# Patient Record
Sex: FEMALE | Race: OTHER | NOT HISPANIC OR LATINO | URBAN - METROPOLITAN AREA
[De-identification: names, ages, dates, MRNs, and addresses within clinical notes are randomized per-mention and may not be internally consistent; named-entity substitution may affect disease eponyms.]

---

## 2023-08-15 LAB — CHORIOGONADOTROPIN (MIU/ML) IN SER/PLAS: 203 MIU/ML

## 2023-09-15 LAB
ABO GROUP (TYPE) IN BLOOD: NORMAL
ANTIBODY SCREEN: NORMAL
ERYTHROCYTE DISTRIBUTION WIDTH (RATIO) BY AUTOMATED COUNT: 12.5 % (ref 11.5–14.5)
ERYTHROCYTE MEAN CORPUSCULAR HEMOGLOBIN CONCENTRATION (G/DL) BY AUTOMATED: 34 G/DL (ref 32–36)
ERYTHROCYTE MEAN CORPUSCULAR VOLUME (FL) BY AUTOMATED COUNT: 94 FL (ref 80–100)
ERYTHROCYTES (10*6/UL) IN BLOOD BY AUTOMATED COUNT: 4.71 X10E12/L (ref 4–5.2)
ESTIMATED AVERAGE GLUCOSE FOR HBA1C: 100 MG/DL
HEMATOCRIT (%) IN BLOOD BY AUTOMATED COUNT: 44.1 % (ref 36–46)
HEMOGLOBIN (G/DL) IN BLOOD: 15 G/DL (ref 12–16)
HEMOGLOBIN A1C/HEMOGLOBIN TOTAL IN BLOOD: 5.1 %
HEPATITIS B VIRUS SURFACE AG PRESENCE IN SERUM: NONREACTIVE
HEPATITIS C VIRUS AB PRESENCE IN SERUM: NONREACTIVE
HIV 1/ 2 AG/AB SCREEN: NONREACTIVE
LEUKOCYTES (10*3/UL) IN BLOOD BY AUTOMATED COUNT: 12.4 X10E9/L (ref 4.4–11.3)
PLATELETS (10*3/UL) IN BLOOD AUTOMATED COUNT: 450 X10E9/L (ref 150–450)
REFLEX ADDED, ANEMIA PANEL: ABNORMAL
RH FACTOR: NORMAL
RUBELLA VIRUS IGG AB: POSITIVE
SYPHILIS TOTAL AB: NONREACTIVE
VARICELLA ZOSTER IGG: NEGATIVE

## 2023-09-16 LAB — URINE CULTURE: NORMAL

## 2023-09-19 LAB
CHLAMYDIA TRACH., AMPLIFIED: NEGATIVE
N. GONORRHEA, AMPLIFIED: NEGATIVE
TRICHOMONAS VAGINALIS: NEGATIVE

## 2023-09-28 PROBLEM — Z3A.09 9 WEEKS GESTATION OF PREGNANCY (HHS-HCC): Status: ACTIVE | Noted: 2023-09-16

## 2023-09-28 PROBLEM — R12 HEARTBURN DURING PREGNANCY, ANTEPARTUM (HHS-HCC): Status: ACTIVE | Noted: 2023-09-16

## 2023-09-28 PROBLEM — Z34.01: Status: ACTIVE | Noted: 2023-09-15

## 2023-09-28 PROBLEM — O26.899 HEARTBURN DURING PREGNANCY, ANTEPARTUM (HHS-HCC): Status: ACTIVE | Noted: 2023-09-16

## 2023-09-28 RX ORDER — SUCRALFATE 1 G/1
1 TABLET ORAL 3 TIMES DAILY
COMMUNITY
End: 2024-01-04 | Stop reason: WASHOUT

## 2023-10-09 ENCOUNTER — ROUTINE PRENATAL (OUTPATIENT)
Dept: OBSTETRICS AND GYNECOLOGY | Facility: CLINIC | Age: 23
End: 2023-10-09
Payer: COMMERCIAL

## 2023-10-09 VITALS — DIASTOLIC BLOOD PRESSURE: 78 MMHG | WEIGHT: 176 LBS | SYSTOLIC BLOOD PRESSURE: 114 MMHG

## 2023-10-09 DIAGNOSIS — Z13.79 ENCOUNTER FOR GENETIC SCREENING: ICD-10-CM

## 2023-10-09 DIAGNOSIS — O30.041 DICHORIONIC DIAMNIOTIC TWIN PREGNANCY IN FIRST TRIMESTER (HHS-HCC): ICD-10-CM

## 2023-10-09 DIAGNOSIS — E66.9 OBESITY, CLASS II, BMI 35-39.9: ICD-10-CM

## 2023-10-09 DIAGNOSIS — Z3A.12 12 WEEKS GESTATION OF PREGNANCY (HHS-HCC): ICD-10-CM

## 2023-10-09 DIAGNOSIS — O26.899 HEARTBURN DURING PREGNANCY, ANTEPARTUM (HHS-HCC): ICD-10-CM

## 2023-10-09 DIAGNOSIS — R12 HEARTBURN DURING PREGNANCY, ANTEPARTUM (HHS-HCC): ICD-10-CM

## 2023-10-09 DIAGNOSIS — O09.611 YOUNG PRIMIGRAVIDA IN FIRST TRIMESTER (HHS-HCC): Primary | ICD-10-CM

## 2023-10-09 PROBLEM — O30.049 DICHORIONIC DIAMNIOTIC TWIN GESTATION (HHS-HCC): Status: ACTIVE | Noted: 2023-10-09

## 2023-10-09 PROCEDURE — 0501F PRENATAL FLOW SHEET: CPT | Performed by: STUDENT IN AN ORGANIZED HEALTH CARE EDUCATION/TRAINING PROGRAM

## 2023-10-09 RX ORDER — FAMOTIDINE 20 MG/1
20 TABLET, FILM COATED ORAL 2 TIMES DAILY
Qty: 90 TABLET | Refills: 3 | Status: SHIPPED | OUTPATIENT
Start: 2023-10-09 | End: 2024-02-20

## 2023-10-09 RX ORDER — ONDANSETRON 4 MG/1
4 TABLET, ORALLY DISINTEGRATING ORAL EVERY 8 HOURS PRN
Qty: 20 TABLET | Refills: 2 | Status: SHIPPED | OUTPATIENT
Start: 2023-10-09 | End: 2023-10-29

## 2023-10-09 NOTE — ASSESSMENT & PLAN NOTE
Pt has f/up US on 10/19. For growths as well. Pt started on baby aspirin and counseled on risk of Pre-E.

## 2023-10-09 NOTE — PROGRESS NOTES
"Subjective   Patient ID [id number]   Kindra Hunt is a 23 y.o.  at Unknown with a working estimated date of delivery of Not found. who presents for a routine prenatal visit. She denies vaginal bleeding, leakage of fluid, decreased fetal movements, and contractions.    Her pregnancy is complicated by:  Di-Di Twin pregnancy    Objective   Physical Exam  Weight: 79.8 kg (176 lb), Pregravid BMI: Could not be calculated  Expected Total Weight Gain: Could not be calculated   BP: 114/78         Prenatal Labs  Urine dip:  Lab Results   Component Value Date    KETONESU NEGATIVE 2020     Lab Results   Component Value Date    HGB 15.0 09/15/2023    HGB 15.0 09/15/2023    HCT 44.1 09/15/2023    HCT 44.1 09/15/2023    HEPBSAG NONREACTIVE 09/15/2023    HEPBSAG NONREACTIVE 09/15/2023     No results found for: \"PAPPA\", \"AFP\", \"HCG\", \"ESTRIOL\", \"INHBA\"    Imaging  The most recent ultrasound was performed on   with a study GA of  .          Assessment/Plan   Problem List Items Addressed This Visit             ICD-10-CM    12 weeks gestation of pregnancy Z3A.12    Heartburn during pregnancy, antepartum O26.899, R12     Started Pepcid         Dichorionic diamniotic twin gestation O30.049     Pt has f/up US on 10/19. For growths as well. Pt started on baby aspirin and counseled on risk of Pre-E.         Obesity, Class II, BMI 35-39.9 E66.9     Other Visit Diagnoses         Codes    Young primigravida in first trimester    -  Primary O09.611    Relevant Medications    famotidine (Pepcid) 20 mg tablet    ondansetron ODT (Zofran-ODT) 4 mg disintegrating tablet    aspirin 81 mg capsule    Other Relevant Orders    Follow Up In Obstetrics    Encounter for genetic screening     Z13.79    Relevant Orders    [facility] Prenatal Screen              Follow up in 4 weeks for a routine prenatal visit.  "

## 2023-10-10 ENCOUNTER — APPOINTMENT (OUTPATIENT)
Dept: OBSTETRICS AND GYNECOLOGY | Facility: CLINIC | Age: 23
End: 2023-10-10
Payer: COMMERCIAL

## 2023-10-19 ENCOUNTER — ANCILLARY PROCEDURE (OUTPATIENT)
Dept: RADIOLOGY | Facility: CLINIC | Age: 23
End: 2023-10-19
Payer: COMMERCIAL

## 2023-10-19 ENCOUNTER — APPOINTMENT (OUTPATIENT)
Dept: RADIOLOGY | Facility: CLINIC | Age: 23
End: 2023-10-19
Payer: COMMERCIAL

## 2023-10-19 DIAGNOSIS — O30.009 TWIN PREGNANCY (HHS-HCC): ICD-10-CM

## 2023-10-19 DIAGNOSIS — O30.049 TWIN PREGNANCY, DICHORIONIC/DIAMNIOTIC, UNSPECIFIED TRIMESTER (HHS-HCC): ICD-10-CM

## 2023-10-19 PROCEDURE — 76813 OB US NUCHAL MEAS 1 GEST: CPT | Performed by: STUDENT IN AN ORGANIZED HEALTH CARE EDUCATION/TRAINING PROGRAM

## 2023-10-19 PROCEDURE — 76814 OB US NUCHAL MEAS ADD-ON: CPT | Performed by: STUDENT IN AN ORGANIZED HEALTH CARE EDUCATION/TRAINING PROGRAM

## 2023-10-19 PROCEDURE — 76813 OB US NUCHAL MEAS 1 GEST: CPT

## 2023-11-06 PROBLEM — Z3A.16 16 WEEKS GESTATION OF PREGNANCY (HHS-HCC): Status: ACTIVE | Noted: 2023-09-16

## 2023-11-08 ENCOUNTER — ROUTINE PRENATAL (OUTPATIENT)
Dept: OBSTETRICS AND GYNECOLOGY | Facility: CLINIC | Age: 23
End: 2023-11-08
Payer: COMMERCIAL

## 2023-11-08 VITALS — SYSTOLIC BLOOD PRESSURE: 118 MMHG | WEIGHT: 175 LBS | DIASTOLIC BLOOD PRESSURE: 82 MMHG

## 2023-11-08 DIAGNOSIS — O30.042 DICHORIONIC DIAMNIOTIC TWIN PREGNANCY IN SECOND TRIMESTER (HHS-HCC): Primary | ICD-10-CM

## 2023-11-08 DIAGNOSIS — Z3A.16 16 WEEKS GESTATION OF PREGNANCY (HHS-HCC): ICD-10-CM

## 2023-11-08 PROCEDURE — 0501F PRENATAL FLOW SHEET: CPT | Performed by: OBSTETRICS & GYNECOLOGY

## 2023-11-08 RX ORDER — ONDANSETRON 4 MG/1
4 TABLET, ORALLY DISINTEGRATING ORAL EVERY 8 HOURS PRN
COMMUNITY

## 2023-11-08 NOTE — PROGRESS NOTES
Subjective   Patient ID [id number]   Kindra Hunt is a 23 y.o. who presents for a routine prenatal visit. She denies vaginal bleeding, leakage of fluid, decreased fetal movements, or contractions.      Objective   Physical Exam  Weight: 79.4 kg (175 lb)  Expected Total Weight Gain: Could not be calculated   Pregravid BMI: Could not be calculated  BP: 118/82  Fetal Heart Rate: 152      Prenatal Labs  Urine dip:  Lab Results   Component Value Date    KETONESU NEGATIVE 12/19/2020       Lab Results   Component Value Date    HGB 15.0 09/15/2023    HGB 15.0 09/15/2023    HCT 44.1 09/15/2023    HCT 44.1 09/15/2023    ABO A 09/15/2023    ABO A 09/15/2023    HEPBSAG NONREACTIVE 09/15/2023    HEPBSAG NONREACTIVE 09/15/2023         Problem List Items Addressed This Visit       16 weeks gestation of pregnancy    Dichorionic diamniotic twin gestation - Primary    Overview     Carrier screen returned negative. NIPS returned risk reducing.  Early anatomy survey and NT returned in normal range X2.  Plan serial growth imaging and delivery at 38.0-38.6.             Continue prenatal vitamin.  Labs reviewed.  Anatomy ultrasound is scheduled. Plan of care was reviewed.  Follow up as scheduled for a routine prenatal visit.

## 2023-12-06 PROBLEM — Z3A.20 20 WEEKS GESTATION OF PREGNANCY (HHS-HCC): Status: ACTIVE | Noted: 2023-09-16

## 2023-12-07 ENCOUNTER — ROUTINE PRENATAL (OUTPATIENT)
Dept: OBSTETRICS AND GYNECOLOGY | Facility: CLINIC | Age: 23
End: 2023-12-07
Payer: COMMERCIAL

## 2023-12-07 VITALS — SYSTOLIC BLOOD PRESSURE: 104 MMHG | WEIGHT: 177 LBS | DIASTOLIC BLOOD PRESSURE: 66 MMHG

## 2023-12-07 DIAGNOSIS — Z3A.20 20 WEEKS GESTATION OF PREGNANCY (HHS-HCC): Primary | ICD-10-CM

## 2023-12-07 DIAGNOSIS — O30.042 DICHORIONIC DIAMNIOTIC TWIN PREGNANCY IN SECOND TRIMESTER (HHS-HCC): ICD-10-CM

## 2023-12-07 PROCEDURE — 0501F PRENATAL FLOW SHEET: CPT | Performed by: OBSTETRICS & GYNECOLOGY

## 2023-12-07 NOTE — PROGRESS NOTES
Subjective   Patient ID [id number]   Kindra Hunt is a 23 y.o. who presents for a routine prenatal visit. She has twins and is 20.1 weeks gestation. She denies vaginal bleeding, leakage of fluid, decreased fetal movements, or contractions.      Objective   Physical Exam  Weight: 80.3 kg (177 lb)  Expected Total Weight Gain: Could not be calculated   Pregravid BMI: Could not be calculated  BP: 104/66  Fetal Heart Rate: 145 Fundal Height (cm): 22 cm   for second twin.  Prenatal Labs  Urine dip:  Lab Results   Component Value Date    KETONESU NEGATIVE 12/19/2020       Lab Results   Component Value Date    HGB 15.0 09/15/2023    HGB 15.0 09/15/2023    HCT 44.1 09/15/2023    HCT 44.1 09/15/2023    ABO A 09/15/2023    ABO A 09/15/2023    HEPBSAG NONREACTIVE 09/15/2023    HEPBSAG NONREACTIVE 09/15/2023         Problem List Items Addressed This Visit       20 weeks gestation of pregnancy - Primary    Dichorionic diamniotic twin gestation    Overview     Carrier screen returned negative. NIPS returned risk reducing.  Early anatomy survey and NT returned in normal range X2.  Plan serial growth imaging and delivery at 38.0-38.6.             Continue prenatal vitamin.  Labs reviewed.  Discussed usn is in 1 week. Glucola next visit.   Follow up as scheduled for a routine prenatal visit.

## 2023-12-13 ENCOUNTER — INITIAL PRENATAL (OUTPATIENT)
Dept: MATERNAL FETAL MEDICINE | Facility: CLINIC | Age: 23
End: 2023-12-13
Payer: COMMERCIAL

## 2023-12-13 ENCOUNTER — ANCILLARY PROCEDURE (OUTPATIENT)
Dept: RADIOLOGY | Facility: CLINIC | Age: 23
End: 2023-12-13
Payer: COMMERCIAL

## 2023-12-13 DIAGNOSIS — O30.049 TWIN PREGNANCY, DICHORIONIC/DIAMNIOTIC, UNSPECIFIED TRIMESTER (HHS-HCC): ICD-10-CM

## 2023-12-13 DIAGNOSIS — Z34.90 PREGNANT (HHS-HCC): ICD-10-CM

## 2023-12-13 DIAGNOSIS — O35.9XX2: Primary | ICD-10-CM

## 2023-12-13 PROCEDURE — 76811 OB US DETAILED SNGL FETUS: CPT

## 2023-12-13 PROCEDURE — 76811 OB US DETAILED SNGL FETUS: CPT | Performed by: OBSTETRICS & GYNECOLOGY

## 2023-12-13 PROCEDURE — 76812 OB US DETAILED ADDL FETUS: CPT | Performed by: OBSTETRICS & GYNECOLOGY

## 2023-12-13 PROCEDURE — 99213 OFFICE O/P EST LOW 20 MIN: CPT | Performed by: OBSTETRICS & GYNECOLOGY

## 2023-12-14 ENCOUNTER — APPOINTMENT (OUTPATIENT)
Dept: RADIOLOGY | Facility: CLINIC | Age: 23
End: 2023-12-14
Payer: COMMERCIAL

## 2023-12-14 PROBLEM — O35.9XX2: Status: ACTIVE | Noted: 2023-12-14

## 2023-12-14 NOTE — PROGRESS NOTES
Ultrasound Findings:  DC/Da twins of similar genders.  The FOB thinks he and his sister had septal defects which closed spontaneously.  A targeted anatomic survey was indicated due to the increased risk for fetal malformations with twinning and the finding of renal pelvic dilation of twin B.  - Dichorionic/diamniotic twin gestation is noted. Twin A is on the left and B on the right.  -Fetal biometry of both twins is concordant and consistent with the stated gestational age  -Detailed anatomic evaluation of the fetal brain/ventricles, face, heart/outflow tracts and chest anatomy, abdominal organ specific anatomy, number/length/architecture of limbs and detailed evaluation of the umbilical cord and placenta and other fetal anatomy as clinically indicated was performed on both twins.   - Twin A- normal anatomic survey  - Twin B- Right pyelectasis with APRD of 5.3mm.  There was no caliectasis and the renal parenchyma appears normal. Lt diaphragm not seen well  Isolated pyelectasis increases the risk for Trisomy 21 by approximately 2.8 fold.  Correlation with previous screening or maternal age revises risk to 3751. Genetic counseling is available if desired. The finding of renal pyelectasis also increases the risk for urinary reflux and and obstructive uropathy.  evaluation will be necessary if the renal dilation persists past 33 weeks.  An otherwise normal sonogram cannot exclude all structural and functional deficits.  The parents were also asked if they would like a fetal heart evaluation noting it will be low yield given our normal findings.  They   The patient was informed of the above finding and all questions addressed. ( see consultation note)    Counseling provided:  Counseling was performed due to right  pyelectasis of twin B on the anatomic survey today.  There were no other abnormalities.  The following counseling was given:   - Measurements show a mild increase in the size if the renal pelvis: 5.3  mm.  The tissue of the kidney appears normal.  Ureters were not seen. No other malformations were noted  - The anatomy, components and function of the components were reviewed. and ultrasound findings explained.  - Mild hydronephrosis, or pyelectasis, is a common finding seen during ultrasound evaluation.  It referes to mild dilation of the fetal renal pelvis. It has been estimated that about 2-3% of fetuses studied by ultrasound have mild dilation of the renal pelvis.    -This finding is also more common in males than in females. This is a male fetus by cfDNA and ultrasound.  - Extra fluid in the renal pelves can be caused from: maternal hormones, obstruction and reflux.  These abnormalities were explained.  - Kidneys can also have pyelectasis and function totally normal.  Function cannot be determined by an ultrasound evaluation.  - For the majority of cases (90%) with mild hydronephrosis the long-term prognosis is excellent with no need for any surgical intervention.  - One  follow-up ultrasound study at 34-36 weeks gestation is recommended. It should be performed at that time even if early third trimester scans are normal as it correlates better with outcome.  - If the kidneys appear normal, no follow up is needed. If pyelectasis is still present,  evaluation is recommended. Typically, there is a direct correlation of the degree of renal pelvis dilation and subsequent uropathy and the need for surgical intervention. The measurements in this case are mildly elevated and not expected to be clinically significant.   -There is an association of mild dilation of the renal pelves and Down syndrome . Down syndrome is usually a sporadic event related to increasing maternal age which happens at the time of conception.  She had a rr cfDNA with a residual risk of 1/10,000.  This finding increases the risk to 3751. This data referred to fetuses with bilateral dilation.  It is not clear if unilateral dilation  increases risk.    The parents expressed understanding of the above. She was told to schedule a repeat evaluaton in 4 weeks for twin growth evaluation. We will not determine if  evaluation is needed until after 33 weeks.

## 2023-12-29 ENCOUNTER — TELEPHONE (OUTPATIENT)
Dept: OBSTETRICS AND GYNECOLOGY | Facility: CLINIC | Age: 23
End: 2023-12-29
Payer: COMMERCIAL

## 2023-12-29 NOTE — TELEPHONE ENCOUNTER
Patient called in to office and states is not feeling baby B move as much. She states the movements feel different than before and she is feeling baby A move more. She did feel a few kicks from baby B a little bit ago. Patient stated that she has not drank much water since she was at work and just got off. Patient advised to increase her water intake including cold water and if she is still worried about the movements then she should get evaluated at [facility].

## 2024-01-03 PROBLEM — Z3A.24 24 WEEKS GESTATION OF PREGNANCY (HHS-HCC): Status: ACTIVE | Noted: 2023-09-16

## 2024-01-04 ENCOUNTER — ROUTINE PRENATAL (OUTPATIENT)
Dept: OBSTETRICS AND GYNECOLOGY | Facility: CLINIC | Age: 24
End: 2024-01-04
Payer: COMMERCIAL

## 2024-01-04 ENCOUNTER — LAB (OUTPATIENT)
Dept: LAB | Facility: LAB | Age: 24
End: 2024-01-04
Payer: COMMERCIAL

## 2024-01-04 VITALS — WEIGHT: 184 LBS | DIASTOLIC BLOOD PRESSURE: 84 MMHG | SYSTOLIC BLOOD PRESSURE: 124 MMHG

## 2024-01-04 DIAGNOSIS — O30.042 DICHORIONIC DIAMNIOTIC TWIN PREGNANCY IN SECOND TRIMESTER (HHS-HCC): Primary | ICD-10-CM

## 2024-01-04 DIAGNOSIS — O09.613 YOUNG PRIMIGRAVIDA IN THIRD TRIMESTER (HHS-HCC): ICD-10-CM

## 2024-01-04 DIAGNOSIS — Z3A.24 24 WEEKS GESTATION OF PREGNANCY (HHS-HCC): ICD-10-CM

## 2024-01-04 LAB
ERYTHROCYTE [DISTWIDTH] IN BLOOD BY AUTOMATED COUNT: 13.5 % (ref 11.5–14.5)
GLUCOSE 1H P 50 G GLC PO SERPL-MCNC: 108 MG/DL
HCT VFR BLD AUTO: 39.3 % (ref 36–46)
HGB BLD-MCNC: 13.2 G/DL (ref 12–16)
MCH RBC QN AUTO: 33.2 PG (ref 26–34)
MCHC RBC AUTO-ENTMCNC: 33.6 G/DL (ref 32–36)
MCV RBC AUTO: 99 FL (ref 80–100)
NRBC BLD-RTO: 0 /100 WBCS (ref 0–0)
PLATELET # BLD AUTO: 395 X10*3/UL (ref 150–450)
RBC # BLD AUTO: 3.97 X10*6/UL (ref 4–5.2)
REFLEX ADDED, ANEMIA PANEL: NORMAL
T PALLIDUM AB SER QL: NONREACTIVE
WBC # BLD AUTO: 10.4 X10*3/UL (ref 4.4–11.3)

## 2024-01-04 PROCEDURE — 82947 ASSAY GLUCOSE BLOOD QUANT: CPT

## 2024-01-04 PROCEDURE — 36415 COLL VENOUS BLD VENIPUNCTURE: CPT

## 2024-01-04 PROCEDURE — 86780 TREPONEMA PALLIDUM: CPT

## 2024-01-04 PROCEDURE — 85027 COMPLETE CBC AUTOMATED: CPT

## 2024-01-04 PROCEDURE — 0501F PRENATAL FLOW SHEET: CPT | Performed by: OBSTETRICS & GYNECOLOGY

## 2024-01-04 RX ORDER — ASPIRIN 81 MG/1
81 TABLET ORAL 2 TIMES DAILY
COMMUNITY
End: 2024-04-14 | Stop reason: HOSPADM

## 2024-01-04 NOTE — PROGRESS NOTES
Subjective   Patient ID [id number]   Kindra Hunt is a 23 y.o. who presents for a routine prenatal visit. She denies vaginal bleeding, leakage of fluid, decreased fetal movements, or contractions. Twin pregnancy. She is doing glucola today.      Objective   Physical Exam  Weight: 83.5 kg (184 lb)  Expected Total Weight Gain: Could not be calculated   Pregravid BMI: Could not be calculated  BP: 124/84         Prenatal Labs  Urine dip:  Lab Results   Component Value Date    KETONESU NEGATIVE 12/19/2020       Lab Results   Component Value Date    HGB 15.0 09/15/2023    HGB 15.0 09/15/2023    HCT 44.1 09/15/2023    HCT 44.1 09/15/2023    ABO A 09/15/2023    ABO A 09/15/2023    HEPBSAG NONREACTIVE 09/15/2023    HEPBSAG NONREACTIVE 09/15/2023         Problem List Items Addressed This Visit       24 weeks gestation of pregnancy    Relevant Orders    Glucose, 1 Hour Screen, Pregnancy    CBC Anemia Panel With Reflex,Pregnancy    Syphilis Screen with Reflex    Dichorionic diamniotic twin pregnancy in second trimester - Primary    Overview     Carrier screen returned negative. NIPS returned risk reducing.  Early anatomy survey and NT returned in normal range X2.  Plan serial growth imaging and delivery at 38.0-38.6.          Other Visit Diagnoses       Young primigravida in third trimester                 Continue prenatal vitamin.  Labs reviewed.  Glucola today.  Follow up as scheduled for a routine prenatal visit.

## 2024-01-10 ENCOUNTER — ANCILLARY PROCEDURE (OUTPATIENT)
Dept: RADIOLOGY | Facility: CLINIC | Age: 24
End: 2024-01-10
Payer: COMMERCIAL

## 2024-01-10 DIAGNOSIS — Z32.01 PREGNANCY TEST POSITIVE (HHS-HCC): ICD-10-CM

## 2024-01-10 PROCEDURE — 76816 OB US FOLLOW-UP PER FETUS: CPT

## 2024-01-10 PROCEDURE — 76816 OB US FOLLOW-UP PER FETUS: CPT | Performed by: OBSTETRICS & GYNECOLOGY

## 2024-01-10 PROCEDURE — 76819 FETAL BIOPHYS PROFIL W/O NST: CPT | Performed by: OBSTETRICS & GYNECOLOGY

## 2024-01-11 PROBLEM — Z3A.25 25 WEEKS GESTATION OF PREGNANCY (HHS-HCC): Status: ACTIVE | Noted: 2023-09-16

## 2024-01-29 ENCOUNTER — TELEPHONE (OUTPATIENT)
Dept: OBSTETRICS AND GYNECOLOGY | Facility: CLINIC | Age: 24
End: 2024-01-29
Payer: COMMERCIAL

## 2024-01-29 NOTE — TELEPHONE ENCOUNTER
Spoke with Kindra, denies vaginal bleeding, spotting or leaking of fluid.  Describes some muscle soreness but denies cramping, tightening or contractions. Reports good fetal movement. If develops any of the above will call for further evaluation

## 2024-01-29 NOTE — TELEPHONE ENCOUNTER
Patient states she fell down 2-3 steps last night, didn't fall on her stomach, fell on her butt, she states blood pressure and heart rate have been good, and felt both babies move, please advise if she should do anything more?

## 2024-01-30 PROBLEM — Z3A.28 28 WEEKS GESTATION OF PREGNANCY (HHS-HCC): Status: ACTIVE | Noted: 2023-09-16

## 2024-02-01 ENCOUNTER — HOSPITAL ENCOUNTER (OUTPATIENT)
Dept: RADIOLOGY | Facility: CLINIC | Age: 24
Discharge: HOME | End: 2024-02-01
Payer: COMMERCIAL

## 2024-02-01 ENCOUNTER — ROUTINE PRENATAL (OUTPATIENT)
Dept: OBSTETRICS AND GYNECOLOGY | Facility: CLINIC | Age: 24
End: 2024-02-01
Payer: COMMERCIAL

## 2024-02-01 VITALS — SYSTOLIC BLOOD PRESSURE: 110 MMHG | DIASTOLIC BLOOD PRESSURE: 70 MMHG | WEIGHT: 185 LBS

## 2024-02-01 DIAGNOSIS — O35.9XX2: ICD-10-CM

## 2024-02-01 DIAGNOSIS — O26.899 HEARTBURN DURING PREGNANCY, ANTEPARTUM (HHS-HCC): ICD-10-CM

## 2024-02-01 DIAGNOSIS — R12 HEARTBURN DURING PREGNANCY, ANTEPARTUM (HHS-HCC): ICD-10-CM

## 2024-02-01 DIAGNOSIS — Z23 NEED FOR DIPHTHERIA-TETANUS-PERTUSSIS (TDAP) VACCINE: ICD-10-CM

## 2024-02-01 DIAGNOSIS — Z32.01 PREGNANCY TEST POSITIVE (HHS-HCC): ICD-10-CM

## 2024-02-01 DIAGNOSIS — Z3A.28 28 WEEKS GESTATION OF PREGNANCY (HHS-HCC): ICD-10-CM

## 2024-02-01 DIAGNOSIS — O30.043 DICHORIONIC DIAMNIOTIC TWIN PREGNANCY IN THIRD TRIMESTER (HHS-HCC): Primary | ICD-10-CM

## 2024-02-01 PROCEDURE — 76816 OB US FOLLOW-UP PER FETUS: CPT | Performed by: STUDENT IN AN ORGANIZED HEALTH CARE EDUCATION/TRAINING PROGRAM

## 2024-02-01 PROCEDURE — 76819 FETAL BIOPHYS PROFIL W/O NST: CPT | Performed by: STUDENT IN AN ORGANIZED HEALTH CARE EDUCATION/TRAINING PROGRAM

## 2024-02-01 PROCEDURE — 76816 OB US FOLLOW-UP PER FETUS: CPT

## 2024-02-01 PROCEDURE — 90471 IMMUNIZATION ADMIN: CPT | Performed by: OBSTETRICS & GYNECOLOGY

## 2024-02-01 PROCEDURE — 0501F PRENATAL FLOW SHEET: CPT | Performed by: OBSTETRICS & GYNECOLOGY

## 2024-02-01 PROCEDURE — 90715 TDAP VACCINE 7 YRS/> IM: CPT | Performed by: OBSTETRICS & GYNECOLOGY

## 2024-02-01 NOTE — PROGRESS NOTES
Subjective   Patient ID [id number]   Kindra Hunt is a 23 y.o. who presents for a routine prenatal visit. She denies vaginal bleeding, leakage of fluid, decreased fetal movements, or contractions. She is willing to have Tdap today. Both babies are active.       Objective   Physical Exam  Weight: 83.9 kg (185 lb)  Expected Total Weight Gain: Could not be calculated   Pregravid BMI: Could not be calculated  BP: 110/70  Fetal Heart Rate: 135 Fundal Height (cm): 31 cm    Prenatal Labs  Urine dip:  Lab Results   Component Value Date    KETONESU NEGATIVE 12/19/2020       Lab Results   Component Value Date    HGB 13.2 01/04/2024    HCT 39.3 01/04/2024    ABO A 09/15/2023    ABO A 09/15/2023    HEPBSAG NONREACTIVE 09/15/2023    HEPBSAG NONREACTIVE 09/15/2023         Problem List Items Addressed This Visit       28 weeks gestation of pregnancy    Overview     25 week EFW twin A 741g, 33%  and Twin B 764g, 42%.         Heartburn during pregnancy, antepartum    Dichorionic diamniotic twin pregnancy in third trimester - Primary    Overview     Carrier screen returned negative. NIPS returned risk reducing.  Early anatomy survey and NT returned in normal range X2.  Plan serial growth imaging and delivery at 38.0-38.6.   [place] [facility] [place]         Known fetal anomaly, antepartum, fetus 2    Overview     Twin B- Right pyelectasis with APRD of 5.3mm   Pyelectasis resolved at follow up usn at 25 weeks gestation.          Other Visit Diagnoses       Need for diphtheria-tetanus-pertussis (Tdap) vaccine        Relevant Orders    Tdap vaccine, age 7 years and older  (BOOSTRIX)             Continue prenatal vitamin.  Labs reviewed.  Serial growth imaging is planned. Tdap today.  Follow up as scheduled for a routine prenatal visit.

## 2024-02-14 PROBLEM — Z3A.30 30 WEEKS GESTATION OF PREGNANCY (HHS-HCC): Status: ACTIVE | Noted: 2023-09-16

## 2024-02-15 ENCOUNTER — ROUTINE PRENATAL (OUTPATIENT)
Dept: OBSTETRICS AND GYNECOLOGY | Facility: CLINIC | Age: 24
End: 2024-02-15
Payer: COMMERCIAL

## 2024-02-15 VITALS — SYSTOLIC BLOOD PRESSURE: 110 MMHG | WEIGHT: 190 LBS | DIASTOLIC BLOOD PRESSURE: 70 MMHG

## 2024-02-15 DIAGNOSIS — O30.043 DICHORIONIC DIAMNIOTIC TWIN PREGNANCY IN THIRD TRIMESTER (HHS-HCC): Primary | ICD-10-CM

## 2024-02-15 DIAGNOSIS — Z3A.30 30 WEEKS GESTATION OF PREGNANCY (HHS-HCC): ICD-10-CM

## 2024-02-15 PROCEDURE — 0501F PRENATAL FLOW SHEET: CPT | Performed by: OBSTETRICS & GYNECOLOGY

## 2024-02-15 NOTE — PROGRESS NOTES
Subjective   Patient ID [id number]   Kindra Hunt is a 23 y.o. who presents for a routine prenatal visit. She denies vaginal bleeding, leakage of fluid, decreased fetal movements, or contractions.  She has had lightheadedness when lying back for ultrasounds. We discussed leaning to one side or the other.     Objective   Physical Exam  Weight: 86.2 kg (190 lb)  Expected Total Weight Gain: Could not be calculated   Pregravid BMI: Could not be calculated  BP: 110/70  Fetal Heart Rate: 145 Fundal Height (cm): 3 cm    Prenatal Labs  Urine dip:  Lab Results   Component Value Date    KETONESU NEGATIVE 12/19/2020       Lab Results   Component Value Date    HGB 13.2 01/04/2024    HCT 39.3 01/04/2024    ABO A 09/15/2023    ABO A 09/15/2023    HEPBSAG NONREACTIVE 09/15/2023    HEPBSAG NONREACTIVE 09/15/2023         Problem List Items Addressed This Visit       30 weeks gestation of pregnancy    Overview     25 week EFW twin A 741g, 33%  and Twin B 764g, 42%.  28 week EFW twin A 1188g, 39% and twin B 1204g, 43%. Cephalic/breech.          Dichorionic diamniotic twin pregnancy in third trimester - Primary    Overview     Carrier screen returned negative. NIPS returned risk reducing.  Early anatomy survey and NT returned in normal range X2.  Plan serial growth imaging and delivery at 38.0-38.6.   She desires [place] at [facility] [place].             Continue prenatal vitamin.  Labs reviewed.  She has serial ultrasounds scheduled.   Follow up as scheduled for a routine prenatal visit.

## 2024-02-18 DIAGNOSIS — O09.611 YOUNG PRIMIGRAVIDA IN FIRST TRIMESTER (HHS-HCC): ICD-10-CM

## 2024-02-19 DIAGNOSIS — O26.899 HEARTBURN DURING PREGNANCY, ANTEPARTUM (HHS-HCC): ICD-10-CM

## 2024-02-19 DIAGNOSIS — R12 HEARTBURN DURING PREGNANCY, ANTEPARTUM (HHS-HCC): ICD-10-CM

## 2024-02-19 RX ORDER — FAMOTIDINE 20 MG/1
TABLET, FILM COATED ORAL
Qty: 60 TABLET | Refills: 1 | Status: SHIPPED | OUTPATIENT
Start: 2024-02-19 | End: 2024-03-16 | Stop reason: HOSPADM

## 2024-02-20 RX ORDER — FAMOTIDINE 20 MG/1
20 TABLET, FILM COATED ORAL 2 TIMES DAILY
Qty: 180 TABLET | Refills: 1 | Status: SHIPPED | OUTPATIENT
Start: 2024-02-20

## 2024-02-27 PROBLEM — Z3A.32 32 WEEKS GESTATION OF PREGNANCY (HHS-HCC): Status: ACTIVE | Noted: 2023-09-16

## 2024-02-29 ENCOUNTER — APPOINTMENT (OUTPATIENT)
Dept: RADIOLOGY | Facility: CLINIC | Age: 24
End: 2024-02-29
Payer: COMMERCIAL

## 2024-02-29 ENCOUNTER — ROUTINE PRENATAL (OUTPATIENT)
Dept: OBSTETRICS AND GYNECOLOGY | Facility: CLINIC | Age: 24
End: 2024-02-29
Payer: COMMERCIAL

## 2024-02-29 ENCOUNTER — HOSPITAL ENCOUNTER (OUTPATIENT)
Dept: RADIOLOGY | Facility: CLINIC | Age: 24
Discharge: HOME | End: 2024-02-29
Payer: COMMERCIAL

## 2024-02-29 VITALS — WEIGHT: 193 LBS | DIASTOLIC BLOOD PRESSURE: 80 MMHG | SYSTOLIC BLOOD PRESSURE: 126 MMHG

## 2024-02-29 DIAGNOSIS — O30.049 TWIN PREGNANCY, DICHORIONIC/DIAMNIOTIC, UNSPECIFIED TRIMESTER (HHS-HCC): ICD-10-CM

## 2024-02-29 DIAGNOSIS — O30.043 DICHORIONIC DIAMNIOTIC TWIN PREGNANCY IN THIRD TRIMESTER (HHS-HCC): Primary | ICD-10-CM

## 2024-02-29 DIAGNOSIS — Z3A.32 32 WEEKS GESTATION OF PREGNANCY (HHS-HCC): ICD-10-CM

## 2024-02-29 DIAGNOSIS — Z34.90 ENCOUNTER FOR SUPERVISION OF NORMAL PREGNANCY, UNSPECIFIED, UNSPECIFIED TRIMESTER (HHS-HCC): ICD-10-CM

## 2024-02-29 PROCEDURE — 0501F PRENATAL FLOW SHEET: CPT | Performed by: OBSTETRICS & GYNECOLOGY

## 2024-02-29 PROCEDURE — 76816 OB US FOLLOW-UP PER FETUS: CPT

## 2024-02-29 PROCEDURE — 76819 FETAL BIOPHYS PROFIL W/O NST: CPT

## 2024-02-29 NOTE — PROGRESS NOTES
Subjective   Patient ID [id number]   Kindra Hunt is a 23 y.o. who presents for a routine prenatal visit. She denies vaginal bleeding, leakage of fluid, decreased fetal movements, or contractions.  Ultrasound was done prior to visit with both fetuses AGA and cephalic/breech.     Objective   Physical Exam  Weight: 87.5 kg (193 lb)  Expected Total Weight Gain: Could not be calculated   Pregravid BMI: Could not be calculated  BP: 126/80  Fetal Heart Rate: 138 Fundal Height (cm): 35 cm    Prenatal Labs  Urine dip:  Lab Results   Component Value Date    KETONESU NEGATIVE 12/19/2020       Lab Results   Component Value Date    HGB 13.2 01/04/2024    HCT 39.3 01/04/2024    ABO A 09/15/2023    ABO A 09/15/2023    HEPBSAG NONREACTIVE 09/15/2023    HEPBSAG NONREACTIVE 09/15/2023         Problem List Items Addressed This Visit       32 weeks gestation of pregnancy    Overview     25 week EFW twin A 741g, 33%  and Twin B 764g, 42%.  28 week EFW twin A 1188g, 39% and twin B 1204g, 43%. Cephalic/breech.          Dichorionic diamniotic twin pregnancy in third trimester - Primary    Overview     Carrier screen returned negative. NIPS returned risk reducing.  Early anatomy survey and NT returned in normal range X2.  Plan serial growth imaging and delivery at 38.0-38.6.   She desires delivery at [facility] [place].  32 week EFW twin A 1769g, 21%. Twin B 1863g, 32%. Vertex/breech.              Continue prenatal vitamin.  Labs reviewed.  USN was reviewed.  She desires to schedule delivery for 4/12 at [facility] [place].  Follow up as scheduled for a routine prenatal visit.

## 2024-03-13 PROBLEM — Z3A.34 34 WEEKS GESTATION OF PREGNANCY (HHS-HCC): Status: ACTIVE | Noted: 2023-09-16

## 2024-03-14 ENCOUNTER — ROUTINE PRENATAL (OUTPATIENT)
Dept: OBSTETRICS AND GYNECOLOGY | Facility: CLINIC | Age: 24
End: 2024-03-14
Payer: COMMERCIAL

## 2024-03-14 VITALS — WEIGHT: 193 LBS | DIASTOLIC BLOOD PRESSURE: 72 MMHG | SYSTOLIC BLOOD PRESSURE: 112 MMHG

## 2024-03-14 DIAGNOSIS — R12 HEARTBURN DURING PREGNANCY, ANTEPARTUM (HHS-HCC): ICD-10-CM

## 2024-03-14 DIAGNOSIS — M25.559 PREGNANCY RELATED HIP PAIN IN THIRD TRIMESTER, ANTEPARTUM (HHS-HCC): ICD-10-CM

## 2024-03-14 DIAGNOSIS — O26.899 HEARTBURN DURING PREGNANCY, ANTEPARTUM (HHS-HCC): ICD-10-CM

## 2024-03-14 DIAGNOSIS — O35.9XX2: ICD-10-CM

## 2024-03-14 DIAGNOSIS — O26.893 PREGNANCY RELATED HIP PAIN IN THIRD TRIMESTER, ANTEPARTUM (HHS-HCC): ICD-10-CM

## 2024-03-14 DIAGNOSIS — Z3A.34 34 WEEKS GESTATION OF PREGNANCY (HHS-HCC): ICD-10-CM

## 2024-03-14 DIAGNOSIS — O30.043 DICHORIONIC DIAMNIOTIC TWIN PREGNANCY IN THIRD TRIMESTER (HHS-HCC): Primary | ICD-10-CM

## 2024-03-14 PROCEDURE — 0501F PRENATAL FLOW SHEET: CPT | Performed by: OBSTETRICS & GYNECOLOGY

## 2024-03-14 NOTE — PROGRESS NOTES
Subjective   Patient ID [id number]   Kindra Hunt is a 23 y.o. who presents for a routine prenatal visit. She denies vaginal bleeding, leakage of fluid, decreased fetal movements, or contractions.  She notes hip pain and is willing to proceed with pelvic floor pt.     Objective   Physical Exam  Weight: 87.5 kg (193 lb)  Expected Total Weight Gain: Could not be calculated   Pregravid BMI: Could not be calculated  BP: 112/72  Fetal Heart Rate: 150 Fundal Height (cm): 38 cm    Prenatal Labs  Urine dip:  Lab Results   Component Value Date    KETONESU NEGATIVE 12/19/2020       Lab Results   Component Value Date    HGB 13.2 01/04/2024    HCT 39.3 01/04/2024    ABO A 09/15/2023    ABO A 09/15/2023    HEPBSAG NONREACTIVE 09/15/2023    HEPBSAG NONREACTIVE 09/15/2023         Problem List Items Addressed This Visit       34 weeks gestation of pregnancy    Overview     25 week EFW twin A 741g, 33%  and Twin B 764g, 42%.  28 week EFW twin A 1188g, 39% and twin B 1204g, 43%. Cephalic/breech.   32 week EFW twin A 1769g, 21%. Twin B 1863g, 32%. Vertex/breech          Heartburn during pregnancy, antepartum    Dichorionic diamniotic twin pregnancy in third trimester - Primary    Overview     Carrier screen returned negative. NIPS returned risk reducing.  Early anatomy survey and NT returned in normal range X2.  Plan serial growth imaging and delivery at 38.0-38.6.   She desires delivery at [facility] [place].  32 week EFW twin A 1769g, 21%. Twin B 1863g, 32%. Vertex/breech.          Known fetal anomaly, antepartum, fetus 2    Overview     Twin B- Right pyelectasis with APRD of 5.3mm   Pyelectasis resolved at follow up usn at 25 weeks gestation.         Pregnancy related hip pain in third trimester, antepartum    Overview     Hip pain in pregnancy, trouble sleeping.  Will refer to pelvic floor physical therapy.         Relevant Orders    Referral to Physical Therapy        Continue prenatal vitamin.  Labs reviewed.  Will refer to pelvic floor PT for  hip pain with twins.   Follow up as scheduled for a routine prenatal visit.

## 2024-03-16 ENCOUNTER — HOSPITAL ENCOUNTER (OUTPATIENT)
Facility: HOSPITAL | Age: 24
Discharge: HOME | End: 2024-03-16
Attending: STUDENT IN AN ORGANIZED HEALTH CARE EDUCATION/TRAINING PROGRAM | Admitting: STUDENT IN AN ORGANIZED HEALTH CARE EDUCATION/TRAINING PROGRAM
Payer: COMMERCIAL

## 2024-03-16 ENCOUNTER — HOSPITAL ENCOUNTER (OUTPATIENT)
Facility: HOSPITAL | Age: 24
End: 2024-03-16
Attending: STUDENT IN AN ORGANIZED HEALTH CARE EDUCATION/TRAINING PROGRAM | Admitting: STUDENT IN AN ORGANIZED HEALTH CARE EDUCATION/TRAINING PROGRAM
Payer: COMMERCIAL

## 2024-03-16 VITALS
HEART RATE: 90 BPM | SYSTOLIC BLOOD PRESSURE: 133 MMHG | RESPIRATION RATE: 16 BRPM | WEIGHT: 194 LBS | TEMPERATURE: 98.4 F | HEIGHT: 58 IN | BODY MASS INDEX: 40.72 KG/M2 | OXYGEN SATURATION: 96 % | DIASTOLIC BLOOD PRESSURE: 74 MMHG

## 2024-03-16 DIAGNOSIS — R12 HEARTBURN DURING PREGNANCY, ANTEPARTUM (HHS-HCC): ICD-10-CM

## 2024-03-16 DIAGNOSIS — O26.899 HEARTBURN DURING PREGNANCY, ANTEPARTUM (HHS-HCC): ICD-10-CM

## 2024-03-16 PROCEDURE — 99212 OFFICE O/P EST SF 10 MIN: CPT

## 2024-03-16 PROCEDURE — 59025 FETAL NON-STRESS TEST: CPT

## 2024-03-16 PROCEDURE — 99214 OFFICE O/P EST MOD 30 MIN: CPT

## 2024-03-16 SDOH — ECONOMIC STABILITY: FOOD INSECURITY: WITHIN THE PAST 12 MONTHS, THE FOOD YOU BOUGHT JUST DIDN'T LAST AND YOU DIDN'T HAVE MONEY TO GET MORE.: NEVER TRUE

## 2024-03-16 SDOH — ECONOMIC STABILITY: TRANSPORTATION INSECURITY
IN THE PAST 12 MONTHS, HAS LACK OF TRANSPORTATION KEPT YOU FROM MEETINGS, WORK, OR FROM GETTING THINGS NEEDED FOR DAILY LIVING?: NO

## 2024-03-16 SDOH — HEALTH STABILITY: MENTAL HEALTH: NON-SPECIFIC ACTIVE SUICIDAL THOUGHTS (PAST 1 MONTH): NO

## 2024-03-16 SDOH — HEALTH STABILITY: PHYSICAL HEALTH: ON AVERAGE, HOW MANY MINUTES DO YOU ENGAGE IN EXERCISE AT THIS LEVEL?: 30 MIN

## 2024-03-16 SDOH — SOCIAL STABILITY: SOCIAL INSECURITY: HAS ANYONE EVER THREATENED TO HURT YOUR FAMILY OR YOUR PETS?: NO

## 2024-03-16 SDOH — ECONOMIC STABILITY: FOOD INSECURITY: WITHIN THE PAST 12 MONTHS, YOU WORRIED THAT YOUR FOOD WOULD RUN OUT BEFORE YOU GOT MONEY TO BUY MORE.: NEVER TRUE

## 2024-03-16 SDOH — SOCIAL STABILITY: SOCIAL INSECURITY: ABUSE SCREEN: ADULT

## 2024-03-16 SDOH — HEALTH STABILITY: MENTAL HEALTH: WERE YOU ABLE TO COMPLETE ALL THE BEHAVIORAL HEALTH SCREENINGS?: YES

## 2024-03-16 SDOH — ECONOMIC STABILITY: TRANSPORTATION INSECURITY
IN THE PAST 12 MONTHS, HAS THE LACK OF TRANSPORTATION KEPT YOU FROM MEDICAL APPOINTMENTS OR FROM GETTING MEDICATIONS?: NO

## 2024-03-16 SDOH — ECONOMIC STABILITY: INCOME INSECURITY: HOW HARD IS IT FOR YOU TO PAY FOR THE VERY BASICS LIKE FOOD, HOUSING, MEDICAL CARE, AND HEATING?: NOT HARD AT ALL

## 2024-03-16 SDOH — HEALTH STABILITY: MENTAL HEALTH: WISH TO BE DEAD (PAST 1 MONTH): NO

## 2024-03-16 SDOH — SOCIAL STABILITY: SOCIAL INSECURITY: DO YOU FEEL ANYONE HAS EXPLOITED OR TAKEN ADVANTAGE OF YOU FINANCIALLY OR OF YOUR PERSONAL PROPERTY?: NO

## 2024-03-16 SDOH — SOCIAL STABILITY: SOCIAL INSECURITY: HAVE YOU HAD THOUGHTS OF HARMING ANYONE ELSE?: YES

## 2024-03-16 SDOH — HEALTH STABILITY: MENTAL HEALTH: HAVE YOU USED ANY SUBSTANCES (CANABIS, COCAINE, HEROIN, HALLUCINOGENS, INHALANTS, ETC.) IN THE PAST 12 MONTHS?: NO

## 2024-03-16 SDOH — ECONOMIC STABILITY: HOUSING INSECURITY: DO YOU FEEL UNSAFE GOING BACK TO THE PLACE WHERE YOU ARE LIVING?: NO

## 2024-03-16 SDOH — SOCIAL STABILITY: SOCIAL INSECURITY: PHYSICAL ABUSE: DENIES

## 2024-03-16 SDOH — SOCIAL STABILITY: SOCIAL INSECURITY: ARE THERE ANY APPARENT SIGNS OF INJURIES/BEHAVIORS THAT COULD BE RELATED TO ABUSE/NEGLECT?: NO

## 2024-03-16 SDOH — SOCIAL STABILITY: SOCIAL INSECURITY: VERBAL ABUSE: DENIES

## 2024-03-16 SDOH — HEALTH STABILITY: MENTAL HEALTH: HAVE YOU USED ANY PRESCRIPTION DRUGS OTHER THAN PRESCRIBED IN THE PAST 12 MONTHS?: NO

## 2024-03-16 SDOH — SOCIAL STABILITY: SOCIAL INSECURITY: DOES ANYONE TRY TO KEEP YOU FROM HAVING/CONTACTING OTHER FRIENDS OR DOING THINGS OUTSIDE YOUR HOME?: NO

## 2024-03-16 SDOH — SOCIAL STABILITY: SOCIAL INSECURITY: ARE YOU OR HAVE YOU BEEN THREATENED OR ABUSED PHYSICALLY, EMOTIONALLY, OR SEXUALLY BY ANYONE?: NO

## 2024-03-16 SDOH — HEALTH STABILITY: MENTAL HEALTH: SUICIDAL BEHAVIOR (LIFETIME): NO

## 2024-03-16 SDOH — HEALTH STABILITY: PHYSICAL HEALTH: ON AVERAGE, HOW MANY DAYS PER WEEK DO YOU ENGAGE IN MODERATE TO STRENUOUS EXERCISE (LIKE A BRISK WALK)?: 4 DAYS

## 2024-03-16 ASSESSMENT — LIFESTYLE VARIABLES
HOW OFTEN DO YOU HAVE 6 OR MORE DRINKS ON ONE OCCASION: NEVER
HOW OFTEN DO YOU HAVE A DRINK CONTAINING ALCOHOL: NEVER
SKIP TO QUESTIONS 9-10: 1
HOW MANY STANDARD DRINKS CONTAINING ALCOHOL DO YOU HAVE ON A TYPICAL DAY: PATIENT DOES NOT DRINK
AUDIT-C TOTAL SCORE: 0
AUDIT-C TOTAL SCORE: 0

## 2024-03-16 ASSESSMENT — PATIENT HEALTH QUESTIONNAIRE - PHQ9
1. LITTLE INTEREST OR PLEASURE IN DOING THINGS: NOT AT ALL
2. FEELING DOWN, DEPRESSED OR HOPELESS: NOT AT ALL
SUM OF ALL RESPONSES TO PHQ9 QUESTIONS 1 & 2: 0

## 2024-03-16 ASSESSMENT — PAIN SCALES - GENERAL: PAINLEVEL_OUTOF10: 0 - NO PAIN

## 2024-03-16 NOTE — H&P
Obstetrical Triage History and Physical     Kindra Hunt is a 23 y.o.  at 34w3d     Chief Complaint: Leakage/Loss of Fluid (Leaking fluid last night, a little throughout the night. No leakage currently. Mild abdominal cramping noted last pm.)    Assessment/Plan    False labor   - Cervix: closed/long/high  - TOCO: quiet  - SSE: neg x3  - S/sx of labor reviewed  - Recommended tylenol, warm showers, hot packs for discomfort     IUP at 34w3d   - NST reactive x2  - Good fetal movement x2  - Continue routine prenatal care  - Next appt 3/28, to triage sooner PRN  - Precautions to return discussed     Maternal Well-being  - Vital signs stable and WNL  - All questions and concerns addressed     Plan and tracing reviewed with Dr. Good.  Patient safe and stable for d/c home.    Siobhan Kathleen, APRN-CNP      Active Problems:  There are no active Hospital Problems.      Pregnancy Problems (from 09/15/23 to present)       Problem Noted Resolved    Pregnancy related hip pain in third trimester, antepartum 3/14/2024 by Amanda Myers MD No    Priority:  Medium      Overview Signed 3/14/2024  3:06 PM by Amanda Myers MD     Hip pain in pregnancy, trouble sleeping.  Will refer to pelvic floor physical therapy.         Known fetal anomaly, antepartum, fetus 2 2023 by Britta Duran MD No    Priority:  Medium      Overview Addendum 2024  7:23 AM by Amanda Myers MD     Twin B- Right pyelectasis with APRD of 5.3mm   Pyelectasis resolved at follow up usn at 25 weeks gestation.         Dichorionic diamniotic twin pregnancy in third trimester 10/9/2023 by Kapil Rausch MD No    Priority:  Medium      Overview Addendum 2024  1:35 PM by Amanda Myers MD     Carrier screen returned negative. NIPS returned risk reducing.  Early anatomy survey and NT returned in normal range X2.  Plan serial growth imaging and delivery at 38.0-38.6.   She desires delivery at [facility] [place].  32 week EFW twin A 1769g,  21%. Twin B 1863g, 32%. Vertex/breech.          34 weeks gestation of pregnancy 2023 by Aman Nicole MA No    Priority:  Medium      Overview Addendum 3/14/2024  3:03 PM by Amanda Myers MD     25 week EFW twin A 741g, 33%  and Twin B 764g, 42%.  28 week EFW twin A 1188g, 39% and twin B 1204g, 43%. Cephalic/breech.   32 week EFW twin A 1769g, 21%. Twin B 1863g, 32%. Vertex/breech                Subjective   Kindra is here for leakage of fluid.  Was getting out of bathtub last night and noticed trickling.  Denies continued leaking or large gushes.  Denies VB, ctx, and endorses good fetal movement x2.     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    Social History  Social History     Tobacco Use    Smoking status: Never    Smokeless tobacco: Never   Substance Use Topics    Alcohol use: Not Currently     Substance and Sexual Activity   Drug Use Never       Allergies  Adhesive tape-silicones     Medications  Medications Prior to Admission   Medication Sig Dispense Refill Last Dose    aspirin 81 mg EC tablet Take 1 tablet (81 mg) by mouth 2 times a day.   3/16/2024    famotidine (Pepcid) 20 mg tablet TAKE 1 TABLET BY MOUTH TWICE A  tablet 1 3/16/2024    famotidine (Pepcid) 20 mg tablet Take one tab by mouth 2 times a day 60 tablet 1     ondansetron ODT (Zofran-ODT) 4 mg disintegrating tablet Take  1 tablet (4 mg) by mouth every 8 hours if needed for nausea or vomiting.   More than a month    PNV no.95/ferrous fum/folic ac (PRENATAL ORAL) Take 1 tablet by mouth once daily.   3/15/2024       Objective    Last Vitals  Temp Pulse Resp BP MAP O2 Sat   36.9 °C (98.4 °F) 90 16 133/74   96 %     Physical Examination  Physical Exam  Exam conducted with a chaperone present.   Constitutional:       Appearance: Normal appearance.   HENT:      Head: Normocephalic.   Cardiovascular:      Rate and Rhythm: Normal rate.   Pulmonary:      Effort: Pulmonary effort is normal.   Abdominal:      Comments: Gravid   Genitourinary:     Comments: Cervix: closed/long/high     SSE:  - Cervix visualized  - Negative for pooling, nitrazine, and ferning  - Normal vaginal discharge present    NST:   A:  150, mod variability, +accels, -decels   B: 145, mod variability, +accels, -decels   [place]: quiet  Musculoskeletal:         General: Normal range of motion.   Skin:     General: Skin is warm.   Neurological:      Mental Status: She is alert and oriented to person, place, and time.   Psychiatric:         Mood and Affect: Mood normal.         Behavior: Behavior normal.        Lab Review  Labs in chart were reviewed.

## 2024-03-19 ENCOUNTER — TREATMENT (OUTPATIENT)
Dept: PHYSICAL THERAPY | Facility: CLINIC | Age: 24
End: 2024-03-19
Payer: COMMERCIAL

## 2024-03-19 DIAGNOSIS — M25.559 PREGNANCY RELATED HIP PAIN IN THIRD TRIMESTER, ANTEPARTUM (HHS-HCC): ICD-10-CM

## 2024-03-19 DIAGNOSIS — O26.893 PREGNANCY RELATED HIP PAIN IN THIRD TRIMESTER, ANTEPARTUM (HHS-HCC): ICD-10-CM

## 2024-03-19 PROCEDURE — 97161 PT EVAL LOW COMPLEX 20 MIN: CPT | Mod: GP

## 2024-03-19 PROCEDURE — 97535 SELF CARE MNGMENT TRAINING: CPT | Mod: GP

## 2024-03-19 RX ORDER — FAMOTIDINE 20 MG/1
TABLET, FILM COATED ORAL
Qty: 180 TABLET | Refills: 1 | Status: SHIPPED | OUTPATIENT
Start: 2024-03-19

## 2024-03-19 NOTE — PROGRESS NOTES
Physical Therapy    EVALUATION AND TREATMENT    Name: Kindra Hunt  MRN: [id number]  : 2000  Today's Date: 24     Time Calculation  Start Time: 1446  Stop Time: 1527  Time Calculation (min): 41 min    Assessment:    Pt presenting to the clinic with worsening groin and B hip pain in the 3rd trimester of a twin pregnancy. This pain is symmetrical and occurring mainly with compressive forces like sleeping. She is also having difficulty withstanding progressive weight bearing. She could benefit from continued PT to address musculoskeletal changes associated with pregnancy to improve her quality of life.     Insurance:  Insurance Type: [employer] [place] [employer] [place]  Visit number: IE   Approved # of visits 25  Authorization Needed: no  Cert Date Ends On: n/a    Plan:   PT Plan: Skilled PT  PT Frequency: 1 time per week  Duration: 12 weeks  Rehab Potential: Good  Plan of Care Agreement: Patient  Planned interventions include: biofeedback, cryotherapy, education/instruction, electrical stimulation, gait training, home program, hot pack, kinesiotaping, manual therapy, neuromuscular re-education, self care/home management, therapeutic activities, and therapeutic exercises.   Access Code: [email or web address]  URL: [email or web address]  Date: 2024  Prepared by: Erlinda Esteves    Exercises  - Clamshell  - 1 x daily - 7 x weekly - 2 sets - 10 reps - 5 seconds  hold  - Sidelying Reverse Clamshell  - 1 x daily - 7 x weekly - 2 sets - 10 reps - 5 seconds  hold  - Quadruped Rocking Slow  - 1 x daily - 7 x weekly - 2 sets - 10 reps - 5 seconds  hold  - Seated Figure 4 Piriformis Stretch  - 1 x daily - 7 x weekly - 2 sets - 5 reps - 20-30seconds  hold  - Seated Hip Adduction Isometrics with Ball  - 1 x daily - 7 x weekly - 2 sets - 10 reps - 5 seconds  hold      Current Problem:  1. Pregnancy related hip pain in third trimester, antepartum  Referral to Physical Therapy    Follow Up In Physical Therapy           Subjective   General  Reason for Referral: Pregnancy pain  Referred By: Dr. Myers  4/12 plan for vaginal induction for twins with her first baby. Pt presenting with bilateral hip pain from sleeping on her sides in the last trimester. The pain varies between the left and the right. The pain is located She is also having inner groin pain. Rated 5/10 on a bad day. Not taking any pain medicine.   Currently 35 weeks pregnant   Previously working as a dental assistant     Impairments:  Rolling over   Walking     Precautions  Precautions Comment: pregnancy     Pain  Pain Assessment: 0-10  Pain Score: 8    Objective   Precautions  PMH: no contributing PMH  Fall Risk: no    OBJECTIVE  External and internal assessment explained. Verbal consent obtained to proceed with vaginal exam.  AROM:  Trunk WFL    PROM/Joint Mobility:  Decreased hamstring length B  Hip ER/IR WFL R/L   Appropriate pelvic mobility anterior/posterior tilt     Strength:  Hip abd 3/5 R/L  TA with ab gripping     Special Tests:  + SI cluster testing     Palpation:  TTP pubic symphysis, B greater trochanter   No TTP at other external PF attachments     Observation:   Anterior center of mass   Increased lordosis     SL stance:  + trendelenberg contralateral pelvic drop R/L    Outcome Measure:   NIH CPSI pain 13 NIH CPSI urinary 3 NIH CPSI quality of life 7     Careplan Goals:  1. Pt will be independent in HEP to maximize PT POC   2. Pt will improve NIH CPSI by >50% raw score  3. Pt will be able to improve worst pain severity on NPRS by >2 points MCID   4. Pt will be able to increase hip abduction and extension strength to 5/5   5. Pt will be able to perform all bed mobility with less than 2/10       Jaz Esteves, PT

## 2024-03-20 ASSESSMENT — PAIN - FUNCTIONAL ASSESSMENT: PAIN_FUNCTIONAL_ASSESSMENT: 0-10

## 2024-03-20 ASSESSMENT — ENCOUNTER SYMPTOMS
OCCASIONAL FEELINGS OF UNSTEADINESS: 0
DEPRESSION: 0
LOSS OF SENSATION IN FEET: 0

## 2024-03-20 ASSESSMENT — PAIN SCALES - GENERAL: PAINLEVEL_OUTOF10: 8

## 2024-03-21 ENCOUNTER — APPOINTMENT (OUTPATIENT)
Dept: PHYSICAL THERAPY | Facility: CLINIC | Age: 24
End: 2024-03-21
Payer: COMMERCIAL

## 2024-03-26 ENCOUNTER — APPOINTMENT (OUTPATIENT)
Dept: PHYSICAL THERAPY | Facility: CLINIC | Age: 24
End: 2024-03-26
Payer: COMMERCIAL

## 2024-03-28 ENCOUNTER — ROUTINE PRENATAL (OUTPATIENT)
Dept: OBSTETRICS AND GYNECOLOGY | Facility: CLINIC | Age: 24
End: 2024-03-28
Payer: COMMERCIAL

## 2024-03-28 ENCOUNTER — TELEPHONE (OUTPATIENT)
Dept: OBSTETRICS AND GYNECOLOGY | Facility: CLINIC | Age: 24
End: 2024-03-28

## 2024-03-28 VITALS — WEIGHT: 199 LBS | SYSTOLIC BLOOD PRESSURE: 124 MMHG | DIASTOLIC BLOOD PRESSURE: 82 MMHG | BODY MASS INDEX: 41.59 KG/M2

## 2024-03-28 DIAGNOSIS — O30.043 DICHORIONIC DIAMNIOTIC TWIN PREGNANCY IN THIRD TRIMESTER (HHS-HCC): ICD-10-CM

## 2024-03-28 DIAGNOSIS — O26.893 PREGNANCY RELATED HIP PAIN IN THIRD TRIMESTER, ANTEPARTUM (HHS-HCC): Primary | ICD-10-CM

## 2024-03-28 DIAGNOSIS — O26.899 HEARTBURN DURING PREGNANCY, ANTEPARTUM (HHS-HCC): ICD-10-CM

## 2024-03-28 DIAGNOSIS — Z3A.36 36 WEEKS GESTATION OF PREGNANCY (HHS-HCC): ICD-10-CM

## 2024-03-28 DIAGNOSIS — M25.559 PREGNANCY RELATED HIP PAIN IN THIRD TRIMESTER, ANTEPARTUM (HHS-HCC): Primary | ICD-10-CM

## 2024-03-28 DIAGNOSIS — R12 HEARTBURN DURING PREGNANCY, ANTEPARTUM (HHS-HCC): ICD-10-CM

## 2024-03-28 PROCEDURE — 87081 CULTURE SCREEN ONLY: CPT

## 2024-03-28 PROCEDURE — 0501F PRENATAL FLOW SHEET: CPT | Performed by: OBSTETRICS & GYNECOLOGY

## 2024-03-28 RX ORDER — FAMOTIDINE 20 MG/1
TABLET, FILM COATED ORAL
Qty: 180 TABLET | Refills: 1 | Status: CANCELLED | OUTPATIENT
Start: 2024-03-28

## 2024-03-28 RX ORDER — FAMOTIDINE 20 MG/1
20 TABLET, FILM COATED ORAL 2 TIMES DAILY
Qty: 180 TABLET | Refills: 3 | Status: SHIPPED | OUTPATIENT
Start: 2024-03-28 | End: 2025-03-28

## 2024-03-28 NOTE — PROGRESS NOTES
Subjective   Patient ID [id number]   Kindra Hunt is a 23 y.o.   at 36w1d with a working estimated date of delivery of 2024, by Ultrasound who presents for a routine prenatal visit. She denies vaginal bleeding, leakage of fluid, decreased fetal movements, or contractions. She has twin gestation.       Objective   Physical Exam  Weight: 90.3 kg (199 lb)  Expected Total Weight Gain: 11 kg (24 lb)-19 kg (41 lb)   Pregravid BMI: 37.63  BP: 124/82  Fetal Heart Rate: 165 Fundal Height (cm): 40 cm  FHR twin a 155  SVE closed, 50/-2  Prenatal Labs  Urine dip:  Lab Results   Component Value Date    KETONESU NEGATIVE 2020       Lab Results   Component Value Date    HGB 13.2 2024    HCT 39.3 2024    ABO A 09/15/2023    ABO A 09/15/2023    HEPBSAG NONREACTIVE 09/15/2023    HEPBSAG NONREACTIVE 09/15/2023         Problem List Items Addressed This Visit       36 weeks gestation of pregnancy    Overview     25 week EFW twin A 741g, 33%  and Twin B 764g, 42%.  28 week EFW twin A 1188g, 39% and twin B 1204g, 43%. Cephalic/breech.   32 week EFW twin A 1769g, 21%. Twin B 1863g, 32%. Vertex/breech          Dichorionic diamniotic twin pregnancy in third trimester    Overview     Carrier screen returned negative. NIPS returned risk reducing.  Early anatomy survey and NT returned in normal range X2.  Plan serial growth imaging and delivery at 38.0-38.6.   She desires delivery at [facility] [place].  32 week EFW twin A 1769g, 21%. Twin B 1863g, 32%. Vertex/breech.          Relevant Orders    Group B Streptococcus (GBS) Prenatal Screen, Culture    Heartburn during pregnancy, antepartum    Relevant Medications    famotidine (Pepcid) 20 mg tablet    Pregnancy related hip pain in third trimester, antepartum - Primary    Overview     Hip pain in pregnancy, trouble sleeping.  Will refer to pelvic floor physical therapy.             Continue prenatal vitamin.  Labs reviewed.  GBBS is sent.   Ultrasound is tomorrow.  Follow up as  scheduled for a routine prenatal visit.

## 2024-03-28 NOTE — TELEPHONE ENCOUNTER
Patient called asking if she can get in sooner than her 4-12  date for her induction. Please advise.

## 2024-03-29 ENCOUNTER — HOSPITAL ENCOUNTER (OUTPATIENT)
Dept: RADIOLOGY | Facility: CLINIC | Age: 24
Discharge: HOME | End: 2024-03-29
Payer: COMMERCIAL

## 2024-03-29 DIAGNOSIS — Z34.90 ENCOUNTER FOR SUPERVISION OF NORMAL PREGNANCY, UNSPECIFIED, UNSPECIFIED TRIMESTER (HHS-HCC): ICD-10-CM

## 2024-03-29 DIAGNOSIS — O30.043 DICHORIONIC DIAMNIOTIC TWIN PREGNANCY IN THIRD TRIMESTER (HHS-HCC): ICD-10-CM

## 2024-03-29 PROCEDURE — 76819 FETAL BIOPHYS PROFIL W/O NST: CPT

## 2024-03-29 PROCEDURE — 76816 OB US FOLLOW-UP PER FETUS: CPT

## 2024-03-29 PROCEDURE — 76819 FETAL BIOPHYS PROFIL W/O NST: CPT | Performed by: OBSTETRICS & GYNECOLOGY

## 2024-03-29 PROCEDURE — 76816 OB US FOLLOW-UP PER FETUS: CPT | Performed by: OBSTETRICS & GYNECOLOGY

## 2024-04-01 LAB — GP B STREP GENITAL QL CULT: NORMAL

## 2024-04-02 ENCOUNTER — TREATMENT (OUTPATIENT)
Dept: PHYSICAL THERAPY | Facility: CLINIC | Age: 24
End: 2024-04-02
Payer: COMMERCIAL

## 2024-04-02 DIAGNOSIS — M25.559 PREGNANCY RELATED HIP PAIN IN THIRD TRIMESTER, ANTEPARTUM (HHS-HCC): ICD-10-CM

## 2024-04-02 DIAGNOSIS — O26.893 PREGNANCY RELATED HIP PAIN IN THIRD TRIMESTER, ANTEPARTUM (HHS-HCC): ICD-10-CM

## 2024-04-02 PROBLEM — Z3A.37 37 WEEKS GESTATION OF PREGNANCY (HHS-HCC): Status: ACTIVE | Noted: 2023-09-16

## 2024-04-02 PROCEDURE — 97110 THERAPEUTIC EXERCISES: CPT | Mod: GP

## 2024-04-03 ENCOUNTER — ROUTINE PRENATAL (OUTPATIENT)
Dept: OBSTETRICS AND GYNECOLOGY | Facility: CLINIC | Age: 24
End: 2024-04-03
Payer: COMMERCIAL

## 2024-04-03 VITALS — SYSTOLIC BLOOD PRESSURE: 134 MMHG | BODY MASS INDEX: 43.89 KG/M2 | WEIGHT: 210 LBS | DIASTOLIC BLOOD PRESSURE: 86 MMHG

## 2024-04-03 DIAGNOSIS — O30.043 DICHORIONIC DIAMNIOTIC TWIN PREGNANCY IN THIRD TRIMESTER (HHS-HCC): Primary | ICD-10-CM

## 2024-04-03 DIAGNOSIS — R12 HEARTBURN DURING PREGNANCY, ANTEPARTUM (HHS-HCC): ICD-10-CM

## 2024-04-03 DIAGNOSIS — M25.559 PREGNANCY RELATED HIP PAIN IN THIRD TRIMESTER, ANTEPARTUM (HHS-HCC): ICD-10-CM

## 2024-04-03 DIAGNOSIS — O26.899 HEARTBURN DURING PREGNANCY, ANTEPARTUM (HHS-HCC): ICD-10-CM

## 2024-04-03 DIAGNOSIS — Z3A.37 37 WEEKS GESTATION OF PREGNANCY (HHS-HCC): ICD-10-CM

## 2024-04-03 DIAGNOSIS — O26.893 PREGNANCY RELATED HIP PAIN IN THIRD TRIMESTER, ANTEPARTUM (HHS-HCC): ICD-10-CM

## 2024-04-03 PROCEDURE — 0501F PRENATAL FLOW SHEET: CPT | Performed by: OBSTETRICS & GYNECOLOGY

## 2024-04-03 NOTE — PROGRESS NOTES
Subjective   Patient ID [id number]   Kindra Hunt is a 23 y.o.   at 37w0d with a working estimated date of delivery of 2024, by Ultrasound who presents for a routine prenatal visit. She denies vaginal bleeding, leakage of fluid, decreased fetal movements, or contractions.  Delivery is scheduled for next week.   She denies any s/s of preeclampsia and is willing to begin BP monitoring twice daily.     Objective   Physical Exam  Weight: 95.3 kg (210 lb)  Expected Total Weight Gain: 11 kg (24 lb)-19 kg (41 lb)   Pregravid BMI: 37.63  BP: 130/90  Fetal Heart Rate: 140 Fundal Height (cm): 40 cm    Prenatal Labs  Urine dip:  Lab Results   Component Value Date    KETONESU NEGATIVE 2020       Lab Results   Component Value Date    HGB 13.2 2024    HCT 39.3 2024    ABO A 09/15/2023    ABO A 09/15/2023    HEPBSAG NONREACTIVE 09/15/2023    HEPBSAG NONREACTIVE 09/15/2023         Problem List Items Addressed This Visit       37 weeks gestation of pregnancy    Overview     25 week EFW twin A 741g, 33%  and Twin B 764g, 42%.  28 week EFW twin A 1188g, 39% and twin B 1204g, 43%. Cephalic/breech.   32 week EFW twin A 1769g, 21%. Twin B 1863g, 32%. Vertex/breech          Dichorionic diamniotic twin pregnancy in third trimester - Primary    Overview     Carrier screen returned negative. NIPS returned risk reducing.  Early anatomy survey and NT returned in normal range X2.  Plan serial growth imaging and delivery at 38.0-38.6.   She desires delivery at [facility] [place].  32 week EFW twin A 1769g, 21%. Twin B 1863g, 32%. Vertex/breech.   36 week EFW twin A 2426g, 12% vertex, Twin B 2721g, 34% vertex.         Heartburn during pregnancy, antepartum    Pregnancy related hip pain in third trimester, antepartum    Overview     Hip pain in pregnancy, trouble sleeping.  Will refer to pelvic floor physical therapy.             Continue prenatal vitamin.  Labs reviewed.  S/S of preeclampsia was reviewed. She will begin home BP  monitoring and will call if any pressures are greater than 140/90.   Follow up as scheduled for induction next week.

## 2024-04-10 ENCOUNTER — HOSPITAL ENCOUNTER (INPATIENT)
Facility: HOSPITAL | Age: 24
LOS: 4 days | Discharge: HOME | End: 2024-04-14
Attending: OBSTETRICS & GYNECOLOGY | Admitting: STUDENT IN AN ORGANIZED HEALTH CARE EDUCATION/TRAINING PROGRAM
Payer: COMMERCIAL

## 2024-04-10 ENCOUNTER — ANESTHESIA (OUTPATIENT)
Dept: OBSTETRICS AND GYNECOLOGY | Facility: HOSPITAL | Age: 24
End: 2024-04-10
Payer: COMMERCIAL

## 2024-04-10 ENCOUNTER — APPOINTMENT (OUTPATIENT)
Dept: OBSTETRICS AND GYNECOLOGY | Facility: HOSPITAL | Age: 24
End: 2024-04-10
Payer: COMMERCIAL

## 2024-04-10 ENCOUNTER — ANESTHESIA EVENT (OUTPATIENT)
Dept: OBSTETRICS AND GYNECOLOGY | Facility: HOSPITAL | Age: 24
End: 2024-04-10
Payer: COMMERCIAL

## 2024-04-10 DIAGNOSIS — O30.043 DICHORIONIC DIAMNIOTIC TWIN PREGNANCY IN THIRD TRIMESTER (HHS-HCC): ICD-10-CM

## 2024-04-10 DIAGNOSIS — O14.13 SEVERE PRE-ECLAMPSIA IN THIRD TRIMESTER (HHS-HCC): ICD-10-CM

## 2024-04-10 PROBLEM — K21.9 GASTROESOPHAGEAL REFLUX DISEASE WITHOUT ESOPHAGITIS: Status: ACTIVE | Noted: 2024-04-10

## 2024-04-10 LAB
ABO GROUP (TYPE) IN BLOOD: NORMAL
ANTIBODY SCREEN: NORMAL
ERYTHROCYTE [DISTWIDTH] IN BLOOD BY AUTOMATED COUNT: 13.4 % (ref 11.5–14.5)
HCT VFR BLD AUTO: 39.7 % (ref 36–46)
HGB BLD-MCNC: 13 G/DL (ref 12–16)
MCH RBC QN AUTO: 32.5 PG (ref 26–34)
MCHC RBC AUTO-ENTMCNC: 32.7 G/DL (ref 32–36)
MCV RBC AUTO: 99 FL (ref 80–100)
NRBC BLD-RTO: 0 /100 WBCS (ref 0–0)
PLATELET # BLD AUTO: 253 X10*3/UL (ref 150–450)
RBC # BLD AUTO: 4 X10*6/UL (ref 4–5.2)
RH FACTOR (ANTIGEN D): NORMAL
TREPONEMA PALLIDUM IGG+IGM AB [PRESENCE] IN SERUM OR PLASMA BY IMMUNOASSAY: NONREACTIVE
WBC # BLD AUTO: 8.8 X10*3/UL (ref 4.4–11.3)

## 2024-04-10 PROCEDURE — 2500000001 HC RX 250 WO HCPCS SELF ADMINISTERED DRUGS (ALT 637 FOR MEDICARE OP): Performed by: STUDENT IN AN ORGANIZED HEALTH CARE EDUCATION/TRAINING PROGRAM

## 2024-04-10 PROCEDURE — 86850 RBC ANTIBODY SCREEN: CPT | Performed by: STUDENT IN AN ORGANIZED HEALTH CARE EDUCATION/TRAINING PROGRAM

## 2024-04-10 PROCEDURE — 1120000001 HC OB PRIVATE ROOM DAILY

## 2024-04-10 PROCEDURE — 3E0P7VZ INTRODUCTION OF HORMONE INTO FEMALE REPRODUCTIVE, VIA NATURAL OR ARTIFICIAL OPENING: ICD-10-PCS | Performed by: STUDENT IN AN ORGANIZED HEALTH CARE EDUCATION/TRAINING PROGRAM

## 2024-04-10 PROCEDURE — 2500000005 HC RX 250 GENERAL PHARMACY W/O HCPCS

## 2024-04-10 PROCEDURE — 86920 COMPATIBILITY TEST SPIN: CPT

## 2024-04-10 PROCEDURE — 3E033VJ INTRODUCTION OF OTHER HORMONE INTO PERIPHERAL VEIN, PERCUTANEOUS APPROACH: ICD-10-PCS | Performed by: STUDENT IN AN ORGANIZED HEALTH CARE EDUCATION/TRAINING PROGRAM

## 2024-04-10 PROCEDURE — 2500000004 HC RX 250 GENERAL PHARMACY W/ HCPCS (ALT 636 FOR OP/ED): Performed by: STUDENT IN AN ORGANIZED HEALTH CARE EDUCATION/TRAINING PROGRAM

## 2024-04-10 PROCEDURE — 59050 FETAL MONITOR W/REPORT: CPT

## 2024-04-10 PROCEDURE — 86780 TREPONEMA PALLIDUM: CPT | Performed by: STUDENT IN AN ORGANIZED HEALTH CARE EDUCATION/TRAINING PROGRAM

## 2024-04-10 PROCEDURE — 36415 COLL VENOUS BLD VENIPUNCTURE: CPT | Performed by: STUDENT IN AN ORGANIZED HEALTH CARE EDUCATION/TRAINING PROGRAM

## 2024-04-10 PROCEDURE — 85027 COMPLETE CBC AUTOMATED: CPT | Performed by: STUDENT IN AN ORGANIZED HEALTH CARE EDUCATION/TRAINING PROGRAM

## 2024-04-10 PROCEDURE — 7210000002 HC LABOR PER HOUR

## 2024-04-10 PROCEDURE — 80053 COMPREHEN METABOLIC PANEL: CPT | Performed by: STUDENT IN AN ORGANIZED HEALTH CARE EDUCATION/TRAINING PROGRAM

## 2024-04-10 RX ORDER — FENTANYL CITRATE 50 UG/ML
50 INJECTION, SOLUTION INTRAMUSCULAR; INTRAVENOUS ONCE
Status: COMPLETED | OUTPATIENT
Start: 2024-04-10 | End: 2024-04-10

## 2024-04-10 RX ORDER — OXYTOCIN 10 [USP'U]/ML
10 INJECTION, SOLUTION INTRAMUSCULAR; INTRAVENOUS ONCE AS NEEDED
Status: DISCONTINUED | OUTPATIENT
Start: 2024-04-10 | End: 2024-04-11

## 2024-04-10 RX ORDER — OXYTOCIN/0.9 % SODIUM CHLORIDE 30/500 ML
60 PLASTIC BAG, INJECTION (ML) INTRAVENOUS ONCE AS NEEDED
Status: DISCONTINUED | OUTPATIENT
Start: 2024-04-10 | End: 2024-04-11

## 2024-04-10 RX ORDER — NIFEDIPINE 10 MG/1
10 CAPSULE ORAL ONCE AS NEEDED
Status: DISCONTINUED | OUTPATIENT
Start: 2024-04-10 | End: 2024-04-11

## 2024-04-10 RX ORDER — ONDANSETRON 4 MG/1
4 TABLET, FILM COATED ORAL EVERY 6 HOURS PRN
Status: DISCONTINUED | OUTPATIENT
Start: 2024-04-10 | End: 2024-04-11

## 2024-04-10 RX ORDER — METHYLERGONOVINE MALEATE 0.2 MG/ML
0.2 INJECTION INTRAVENOUS ONCE AS NEEDED
Status: DISCONTINUED | OUTPATIENT
Start: 2024-04-10 | End: 2024-04-11

## 2024-04-10 RX ORDER — SODIUM CHLORIDE, SODIUM LACTATE, POTASSIUM CHLORIDE, CALCIUM CHLORIDE 600; 310; 30; 20 MG/100ML; MG/100ML; MG/100ML; MG/100ML
125 INJECTION, SOLUTION INTRAVENOUS CONTINUOUS
Status: DISCONTINUED | OUTPATIENT
Start: 2024-04-10 | End: 2024-04-11

## 2024-04-10 RX ORDER — CARBOPROST TROMETHAMINE 250 UG/ML
250 INJECTION, SOLUTION INTRAMUSCULAR ONCE AS NEEDED
Status: DISCONTINUED | OUTPATIENT
Start: 2024-04-10 | End: 2024-04-11

## 2024-04-10 RX ORDER — LIDOCAINE HYDROCHLORIDE 10 MG/ML
30 INJECTION INFILTRATION; PERINEURAL ONCE AS NEEDED
Status: DISCONTINUED | OUTPATIENT
Start: 2024-04-10 | End: 2024-04-11

## 2024-04-10 RX ORDER — FENTANYL/BUPIVACAINE/NS/PF 2MCG/ML-.1
PLASTIC BAG, INJECTION (ML) INJECTION AS NEEDED
Status: DISCONTINUED | OUTPATIENT
Start: 2024-04-10 | End: 2024-04-11

## 2024-04-10 RX ORDER — TRANEXAMIC ACID 100 MG/ML
1000 INJECTION, SOLUTION INTRAVENOUS ONCE AS NEEDED
Status: DISCONTINUED | OUTPATIENT
Start: 2024-04-10 | End: 2024-04-11

## 2024-04-10 RX ORDER — METOCLOPRAMIDE HYDROCHLORIDE 5 MG/ML
10 INJECTION INTRAMUSCULAR; INTRAVENOUS EVERY 6 HOURS PRN
Status: DISCONTINUED | OUTPATIENT
Start: 2024-04-10 | End: 2024-04-11

## 2024-04-10 RX ORDER — LABETALOL HYDROCHLORIDE 5 MG/ML
20 INJECTION, SOLUTION INTRAVENOUS ONCE AS NEEDED
Status: DISCONTINUED | OUTPATIENT
Start: 2024-04-10 | End: 2024-04-11

## 2024-04-10 RX ORDER — MISOPROSTOL 200 UG/1
800 TABLET ORAL ONCE AS NEEDED
Status: DISCONTINUED | OUTPATIENT
Start: 2024-04-10 | End: 2024-04-11

## 2024-04-10 RX ORDER — OXYTOCIN/0.9 % SODIUM CHLORIDE 30/500 ML
2-30 PLASTIC BAG, INJECTION (ML) INTRAVENOUS CONTINUOUS
Status: DISCONTINUED | OUTPATIENT
Start: 2024-04-11 | End: 2024-04-11

## 2024-04-10 RX ORDER — LOPERAMIDE HYDROCHLORIDE 2 MG/1
4 CAPSULE ORAL EVERY 2 HOUR PRN
Status: DISCONTINUED | OUTPATIENT
Start: 2024-04-10 | End: 2024-04-11

## 2024-04-10 RX ORDER — METOCLOPRAMIDE 10 MG/1
10 TABLET ORAL EVERY 6 HOURS PRN
Status: DISCONTINUED | OUTPATIENT
Start: 2024-04-10 | End: 2024-04-11

## 2024-04-10 RX ORDER — TERBUTALINE SULFATE 1 MG/ML
0.25 INJECTION SUBCUTANEOUS ONCE AS NEEDED
Status: DISCONTINUED | OUTPATIENT
Start: 2024-04-10 | End: 2024-04-11

## 2024-04-10 RX ORDER — FENTANYL/BUPIVACAINE/NS/PF 2MCG/ML-.1
PLASTIC BAG, INJECTION (ML) INJECTION CONTINUOUS PRN
Status: DISCONTINUED | OUTPATIENT
Start: 2024-04-10 | End: 2024-04-11

## 2024-04-10 RX ORDER — HYDRALAZINE HYDROCHLORIDE 20 MG/ML
5 INJECTION INTRAMUSCULAR; INTRAVENOUS ONCE AS NEEDED
Status: DISCONTINUED | OUTPATIENT
Start: 2024-04-10 | End: 2024-04-11

## 2024-04-10 RX ORDER — ONDANSETRON HYDROCHLORIDE 2 MG/ML
4 INJECTION, SOLUTION INTRAVENOUS EVERY 6 HOURS PRN
Status: DISCONTINUED | OUTPATIENT
Start: 2024-04-10 | End: 2024-04-11

## 2024-04-10 RX ADMIN — Medication 5 ML: at 23:39

## 2024-04-10 RX ADMIN — SODIUM CHLORIDE, POTASSIUM CHLORIDE, SODIUM LACTATE AND CALCIUM CHLORIDE 125 ML/HR: 600; 310; 30; 20 INJECTION, SOLUTION INTRAVENOUS at 19:35

## 2024-04-10 RX ADMIN — MISOPROSTOL 25 MCG: 100 TABLET ORAL at 19:45

## 2024-04-10 RX ADMIN — Medication 14 ML/HR: at 23:45

## 2024-04-10 RX ADMIN — FENTANYL CITRATE 50 MCG: 50 INJECTION INTRAMUSCULAR; INTRAVENOUS at 19:35

## 2024-04-10 RX ADMIN — Medication 5 ML: at 23:42

## 2024-04-10 SDOH — HEALTH STABILITY: MENTAL HEALTH: CURRENT SMOKER: 0

## 2024-04-10 ASSESSMENT — PAIN SCALES - GENERAL
PAINLEVEL_OUTOF10: 0 - NO PAIN
PAINLEVEL_OUTOF10: 3
PAINLEVEL_OUTOF10: 0 - NO PAIN
PAINLEVEL_OUTOF10: 6
PAINLEVEL_OUTOF10: 8

## 2024-04-10 NOTE — H&P
Obstetrical Admission History and Physical     Kindra Hunt is a 23 y.o.  at 38w0d. GLEN: 2024, by Ultrasound. Estimated fetal weight: Last Growth US 3/29 Twin A, EFW 2426g (12%), AC 51%; Twin B EFW 2721 (34%) AC 88%. She has had prenatal care with Dr. Myers .    Chief Complaint: Scheduled Induction    Assessment/Plan    Induction of Labor  -Admit to L&D, consent, scan cephalic/cephalic  -Will start labor with CRB and cytotec  -For AROM and pitocin when appropriate  -Desires vaginal delivery, discuss risks and benefits as well as the potential for  section for maternal and/or fetal indications    Di/Di Twins  -Last Growth US 3/29 Twin A, EFW 2426g (12%), AC 51%; Twin B EFW 2721 (34%) AC 88%  Given concordant growth with EFW > 1500g of twin B, discussed options for delivery including breech extraction of twin B vs  section. Patient desires  delivery if twin B presentation changed to breech intrapartum.  Discussed risks of labor, vaginal delivery, and indications for , including maternal and fetal indications as above.    Maternal Wellbeing  -T&S, CBC on admission  -Nubain and Epidural Per pt request    Fetal Wellbeing  -CEFM, cat 1 currently  -Last Growth US 3/29 Twin A, EFW 2426g (12%), AC 51%; Twin B EFW 2721 (34%) AC 88%    Postpartum Care  -Feeding-Breast  -PPBC: Declines    GBS Negative  -No need for PCN ppx    Seen and d/w Tout    Mathew Jensen MD PGY-3  OB/GYN     Principal Problem:    Labor and delivery indication for care or intervention      Pregnancy Problems (from 09/15/23 to present)       Problem Noted Resolved    Labor and delivery indication for care or intervention 4/10/2024 by Mathew Jensen MD No    Priority:  Medium      Pregnancy related hip pain in third trimester, antepartum 3/14/2024 by Amanda Myers MD No    Priority:  Medium      Overview Signed 3/14/2024  3:06 PM by Amanda Myers MD     Hip pain in pregnancy, trouble  sleeping.  Will refer to pelvic floor physical therapy.         Known fetal anomaly, antepartum, fetus 2 12/14/2023 by Britta Duran MD No    Priority:  Medium      Overview Addendum 1/11/2024  7:23 AM by Amanda Myers MD     Twin B- Right pyelectasis with APRD of 5.3mm   Pyelectasis resolved at follow up usn at 25 weeks gestation.         Dichorionic diamniotic twin pregnancy in third trimester 10/9/2023 by Kapil Rausch MD No    Priority:  Medium      Overview Addendum 3/31/2024  9:46 AM by Amanda Myers MD     Carrier screen returned negative. NIPS returned risk reducing.  Early anatomy survey and NT returned in normal range X2.  Plan serial growth imaging and delivery at 38.0-38.6.   [place] [facility] [place]  32 week EFW twin A 1769g, 21%. Twin B 1863g, 32%. Vertex/breech.   36 week EFW twin A 2426g, 12% vertex, Twin B 2721g, 34% vertex.         37 weeks gestation of pregnancy 9/16/2023 by Aman Nicole MA No    Priority:  Medium      Overview Addendum 3/14/2024  3:03 PM by Amanda Myers MD     25 week EFW twin A 741g, 33%  and Twin B 764g, 42%.  28 week EFW twin A 1188g, 39% and twin B 1204g, 43%. Cephalic/breech.   32 week EFW twin A 1769g, 21%. Twin B 1863g, 32%. Vertex/breech                Options for delivery have been discussed with the patient and she elects for an induction of labor.  Cervical ripening with cytotec, cervidil, other prostaglandin agents has been discussed.  Induction of labor with pitocin, amniotomy, cytotec, and cervical balloon have been discussed in detail. The risks, benefits, complications, alternatives, expected outcomes, potential problems during recuperation and recovery, and the risks of not performing the procedure were discussed with the patient. The patient stated understanding that the risks of delivery include, but are not limited to: death; reaction to medications; injury to bowel, bladder, ureters, uterus, cervix, vagina, and other pelvic  and abdominal structures, infection; blood loss and possible need for transfusion; and potential need for surgery, including hysterectomy. The risks of injury to the infant during delivery were also discussed. All questions were answered. There was concurrence with the planned procedure, and the patient wanted to proceed.    Admit to inpatient status. I anticipate that this patient will require a stay exceeding at least 2 midnights for delivery and postpartum.  Induction of labor.  Management of pregnancy complications, as indicated.    Subjective   Good fetal movement. Denies vaginal bleeding., Denies contractions., Denies leaking of fluid.       Reason for Induction of Labor:  Di/Di Twin Gestation    Patient doing well. No acute concerns at this time.     Obstetrical History   OB History    Para Term  AB Living   1             SAB IAB Ectopic Multiple Live Births                  # Outcome Date GA Lbr Eder/2nd Weight Sex Delivery Anes PTL Lv   1 Current                Past Medical History  Past Medical History:   Diagnosis Date    Acute pharyngitis, unspecified 2015    Sore throat    Chondrocostal junction syndrome (tietze) 2014    Costalchondritis    Dysphagia, unspecified 2015    Dysphagia    Personal history of other diseases of the respiratory system 2015    History of upper respiratory infection        Past Surgical History   History reviewed. No pertinent surgical history.    Social History  Social History     Tobacco Use    Smoking status: Never    Smokeless tobacco: Never   Substance Use Topics    Alcohol use: Not Currently     Substance and Sexual Activity   Drug Use Never       Allergies  Adhesive tape-silicones     Medications  Medications Prior to Admission   Medication Sig Dispense Refill Last Dose    aspirin 81 mg EC tablet Take 1 tablet (81 mg) by mouth 2 times a day.   4/10/2024 at 1200    famotidine (Pepcid) 20 mg tablet TAKE 1 TABLET BY MOUTH TWICE A   "tablet 1 4/10/2024 at 1200    ondansetron ODT (Zofran-ODT) 4 mg disintegrating tablet Take 1 tablet (4 mg) by mouth every 8 hours if needed for nausea or vomiting.   Past Month    PNV no.95/ferrous fum/folic ac (PRENATAL ORAL) Take 1 tablet by mouth once daily.   4/10/2024    famotidine (Pepcid) 20 mg tablet TAKE 1 TABLET BY MOUTH TWICE A  tablet 1     famotidine (Pepcid) 20 mg tablet Take 1 tablet (20 mg) by mouth 2 times a day. 180 tablet 3        Objective    Last Vitals  Temp Pulse Resp BP MAP O2 Sat   36.3 °C (97.3 °F) 83 18 134/78   97 %     Physical Examination  GENERAL: Examination reveals a well developed, well nourished, gravid female in no acute distress. She is alert and cooperative.  HEENT: PERRLA. External ears normal. Nose normal, no erythema or discharge. Mouth and throat clear.  LUNGS: clear to auscultation bilaterally  HEART: regular rate and rhythm, S1, S2 normal, no murmur, click, rub or gallop  FHR is Twin A 130, Twin B 140  , with Accelerations, and a Category I tracing.    [place] reading:  quiet  VAGINA: normal appearing vagina with normal color and discharge and no lesions noted  CERVIX: 1 cm dilated, 50 % effaced, -3 station; MEMBRANES are Intact  EXTREMITIES: no redness or tenderness in the calves or thighs, no edema  SKIN: normal coloration and turgor, no rashes  NEUROLOGICAL: alert, oriented, normal speech, no focal findings or movement disorder noted  PSYCHOLOGICAL: awake and alert; oriented to person, place, and time    Lab Review  Lab Results   Component Value Date    ABO A 04/10/2024    LABRH POS 04/10/2024    ABSCRN NEG 04/10/2024     Lab Results   Component Value Date    WBC 8.8 04/10/2024    HGB 13.0 04/10/2024    HCT 39.7 04/10/2024     04/10/2024     No results found for: \"GRPBSTREP\"  No results found for: \"GLUCOSE\", \"NA\", \"K\", \"CL\", \"CO2\", \"ANIONGAP\", \"BUN\", \"CREATININE\", \"EGFR\", \"CALCIUM\", \"ALBUMIN\", \"PROT\", \"ALKPHOS\", \"ALT\", \"AST\", \"BILITOT\"  No results found for: " "\"UTPCR\"  No results found for: \"APTT\", \"PROTIME\", \"INR\"  No results found for: \"FIBRINOGEN\"  No results found for: \"BNP\"  No results found for: \"LACTATE\"    "

## 2024-04-11 ENCOUNTER — APPOINTMENT (OUTPATIENT)
Dept: OBSTETRICS AND GYNECOLOGY | Facility: CLINIC | Age: 24
End: 2024-04-11
Payer: COMMERCIAL

## 2024-04-11 LAB
ALBUMIN SERPL BCP-MCNC: 3.1 G/DL (ref 3.4–5)
ALP SERPL-CCNC: 226 U/L (ref 33–110)
ALT SERPL W P-5'-P-CCNC: 5 U/L (ref 7–45)
ANION GAP SERPL CALC-SCNC: 18 MMOL/L (ref 10–20)
AST SERPL W P-5'-P-CCNC: 13 U/L (ref 9–39)
BILIRUB SERPL-MCNC: 0.4 MG/DL (ref 0–1.2)
BUN SERPL-MCNC: 6 MG/DL (ref 6–23)
CALCIUM SERPL-MCNC: 8.7 MG/DL (ref 8.6–10.6)
CHLORIDE SERPL-SCNC: 110 MMOL/L (ref 98–107)
CO2 SERPL-SCNC: 17 MMOL/L (ref 21–32)
CREAT SERPL-MCNC: 0.44 MG/DL (ref 0.5–1.05)
EGFRCR SERPLBLD CKD-EPI 2021: >90 ML/MIN/1.73M*2
GLUCOSE SERPL-MCNC: 70 MG/DL (ref 74–99)
POTASSIUM SERPL-SCNC: 4.1 MMOL/L (ref 3.5–5.3)
PROT SERPL-MCNC: 5.5 G/DL (ref 6.4–8.2)
SODIUM SERPL-SCNC: 141 MMOL/L (ref 136–145)

## 2024-04-11 PROCEDURE — 2720000007 HC OR 272 NO HCPCS: Performed by: OBSTETRICS & GYNECOLOGY

## 2024-04-11 PROCEDURE — 2500000004 HC RX 250 GENERAL PHARMACY W/ HCPCS (ALT 636 FOR OP/ED): Performed by: STUDENT IN AN ORGANIZED HEALTH CARE EDUCATION/TRAINING PROGRAM

## 2024-04-11 PROCEDURE — 88307 TISSUE EXAM BY PATHOLOGIST: CPT | Performed by: PATHOLOGY

## 2024-04-11 PROCEDURE — 1100000001 HC PRIVATE ROOM DAILY

## 2024-04-11 PROCEDURE — 99199 UNLISTED SPECIAL SVC PX/RPRT: CPT

## 2024-04-11 PROCEDURE — 2500000005 HC RX 250 GENERAL PHARMACY W/O HCPCS: Performed by: NURSE ANESTHETIST, CERTIFIED REGISTERED

## 2024-04-11 PROCEDURE — 59510 CESAREAN DELIVERY: CPT | Performed by: STUDENT IN AN ORGANIZED HEALTH CARE EDUCATION/TRAINING PROGRAM

## 2024-04-11 PROCEDURE — 2500000001 HC RX 250 WO HCPCS SELF ADMINISTERED DRUGS (ALT 637 FOR MEDICARE OP): Performed by: STUDENT IN AN ORGANIZED HEALTH CARE EDUCATION/TRAINING PROGRAM

## 2024-04-11 PROCEDURE — 01968 ANES/ANALG CS DLVR NEURAXIAL: CPT | Performed by: ANESTHESIOLOGY

## 2024-04-11 PROCEDURE — 2500000002 HC RX 250 W HCPCS SELF ADMINISTERED DRUGS (ALT 637 FOR MEDICARE OP, ALT 636 FOR OP/ED): Performed by: STUDENT IN AN ORGANIZED HEALTH CARE EDUCATION/TRAINING PROGRAM

## 2024-04-11 PROCEDURE — 88307 TISSUE EXAM BY PATHOLOGIST: CPT | Mod: TC,SUR | Performed by: STUDENT IN AN ORGANIZED HEALTH CARE EDUCATION/TRAINING PROGRAM

## 2024-04-11 PROCEDURE — 2500000004 HC RX 250 GENERAL PHARMACY W/ HCPCS (ALT 636 FOR OP/ED): Performed by: NURSE ANESTHETIST, CERTIFIED REGISTERED

## 2024-04-11 PROCEDURE — 2500000005 HC RX 250 GENERAL PHARMACY W/O HCPCS

## 2024-04-11 PROCEDURE — 01967 NEURAXL LBR ANES VAG DLVR: CPT | Performed by: ANESTHESIOLOGY

## 2024-04-11 PROCEDURE — 3700000018 HC OB ANESTHESIA C-SECTION: Performed by: OBSTETRICS & GYNECOLOGY

## 2024-04-11 PROCEDURE — 7210000002 HC LABOR PER HOUR

## 2024-04-11 PROCEDURE — 3700000014 HC AN EPIDURAL BLOCK CHARGE: Performed by: OBSTETRICS & GYNECOLOGY

## 2024-04-11 PROCEDURE — 51701 INSERT BLADDER CATHETER: CPT

## 2024-04-11 PROCEDURE — 7100000016 HC LABOR RECOVERY PER HOUR: Performed by: OBSTETRICS & GYNECOLOGY

## 2024-04-11 PROCEDURE — 59514 CESAREAN DELIVERY ONLY: CPT | Performed by: OBSTETRICS & GYNECOLOGY

## 2024-04-11 PROCEDURE — 2500000001 HC RX 250 WO HCPCS SELF ADMINISTERED DRUGS (ALT 637 FOR MEDICARE OP): Performed by: NURSE ANESTHETIST, CERTIFIED REGISTERED

## 2024-04-11 PROCEDURE — 10907ZC DRAINAGE OF AMNIOTIC FLUID, THERAPEUTIC FROM PRODUCTS OF CONCEPTION, VIA NATURAL OR ARTIFICIAL OPENING: ICD-10-PCS | Performed by: STUDENT IN AN ORGANIZED HEALTH CARE EDUCATION/TRAINING PROGRAM

## 2024-04-11 RX ORDER — MAGNESIUM SULFATE HEPTAHYDRATE 40 MG/ML
2 INJECTION, SOLUTION INTRAVENOUS CONTINUOUS
Status: DISCONTINUED | OUTPATIENT
Start: 2024-04-11 | End: 2024-04-11

## 2024-04-11 RX ORDER — ACETAMINOPHEN 325 MG/1
975 TABLET ORAL EVERY 6 HOURS
Status: DISCONTINUED | OUTPATIENT
Start: 2024-04-12 | End: 2024-04-14 | Stop reason: HOSPADM

## 2024-04-11 RX ORDER — MISOPROSTOL 200 UG/1
800 TABLET ORAL ONCE AS NEEDED
Status: DISCONTINUED | OUTPATIENT
Start: 2024-04-11 | End: 2024-04-12

## 2024-04-11 RX ORDER — DIPHENHYDRAMINE HCL 25 MG
25 CAPSULE ORAL EVERY 4 HOURS PRN
Status: DISCONTINUED | OUTPATIENT
Start: 2024-04-11 | End: 2024-04-14 | Stop reason: HOSPADM

## 2024-04-11 RX ORDER — NIFEDIPINE 30 MG/1
30 TABLET, FILM COATED, EXTENDED RELEASE ORAL
Status: DISCONTINUED | OUTPATIENT
Start: 2024-04-11 | End: 2024-04-12

## 2024-04-11 RX ORDER — KETOROLAC TROMETHAMINE 30 MG/ML
INJECTION, SOLUTION INTRAMUSCULAR; INTRAVENOUS AS NEEDED
Status: DISCONTINUED | OUTPATIENT
Start: 2024-04-11 | End: 2024-04-11

## 2024-04-11 RX ORDER — HYDROMORPHONE HYDROCHLORIDE 1 MG/ML
0.2 INJECTION, SOLUTION INTRAMUSCULAR; INTRAVENOUS; SUBCUTANEOUS EVERY 5 MIN PRN
Status: DISCONTINUED | OUTPATIENT
Start: 2024-04-11 | End: 2024-04-14 | Stop reason: HOSPADM

## 2024-04-11 RX ORDER — OXYCODONE HYDROCHLORIDE 5 MG/1
10 TABLET ORAL EVERY 4 HOURS PRN
Status: DISCONTINUED | OUTPATIENT
Start: 2024-04-12 | End: 2024-04-14 | Stop reason: HOSPADM

## 2024-04-11 RX ORDER — HYDRALAZINE HYDROCHLORIDE 20 MG/ML
5 INJECTION INTRAMUSCULAR; INTRAVENOUS ONCE AS NEEDED
Status: DISCONTINUED | OUTPATIENT
Start: 2024-04-11 | End: 2024-04-14 | Stop reason: HOSPADM

## 2024-04-11 RX ORDER — OXYTOCIN 10 [USP'U]/ML
10 INJECTION, SOLUTION INTRAMUSCULAR; INTRAVENOUS ONCE AS NEEDED
Status: DISCONTINUED | OUTPATIENT
Start: 2024-04-11 | End: 2024-04-14 | Stop reason: HOSPADM

## 2024-04-11 RX ORDER — LABETALOL HYDROCHLORIDE 5 MG/ML
20 INJECTION, SOLUTION INTRAVENOUS ONCE
Status: COMPLETED | OUTPATIENT
Start: 2024-04-11 | End: 2024-04-11

## 2024-04-11 RX ORDER — BUTORPHANOL TARTRATE 1 MG/ML
1 INJECTION INTRAMUSCULAR; INTRAVENOUS
Status: DISCONTINUED | OUTPATIENT
Start: 2024-04-11 | End: 2024-04-14 | Stop reason: HOSPADM

## 2024-04-11 RX ORDER — OXYCODONE HYDROCHLORIDE 5 MG/1
5 TABLET ORAL EVERY 4 HOURS PRN
Status: DISCONTINUED | OUTPATIENT
Start: 2024-04-12 | End: 2024-04-14 | Stop reason: HOSPADM

## 2024-04-11 RX ORDER — DIPHENHYDRAMINE HYDROCHLORIDE 50 MG/ML
25 INJECTION INTRAMUSCULAR; INTRAVENOUS EVERY 4 HOURS PRN
Status: DISCONTINUED | OUTPATIENT
Start: 2024-04-11 | End: 2024-04-14 | Stop reason: HOSPADM

## 2024-04-11 RX ORDER — LIDOCAINE 560 MG/1
1 PATCH PERCUTANEOUS; TOPICAL; TRANSDERMAL
Status: DISCONTINUED | OUTPATIENT
Start: 2024-04-11 | End: 2024-04-14 | Stop reason: HOSPADM

## 2024-04-11 RX ORDER — METHYLERGONOVINE MALEATE 0.2 MG/ML
0.2 INJECTION INTRAVENOUS ONCE AS NEEDED
Status: DISCONTINUED | OUTPATIENT
Start: 2024-04-11 | End: 2024-04-14 | Stop reason: HOSPADM

## 2024-04-11 RX ORDER — POLYETHYLENE GLYCOL 3350 17 G/17G
17 POWDER, FOR SOLUTION ORAL 2 TIMES DAILY PRN
Status: DISCONTINUED | OUTPATIENT
Start: 2024-04-11 | End: 2024-04-14 | Stop reason: HOSPADM

## 2024-04-11 RX ORDER — NALOXONE HYDROCHLORIDE 0.4 MG/ML
0.1 INJECTION, SOLUTION INTRAMUSCULAR; INTRAVENOUS; SUBCUTANEOUS EVERY 5 MIN PRN
Status: DISCONTINUED | OUTPATIENT
Start: 2024-04-11 | End: 2024-04-14 | Stop reason: HOSPADM

## 2024-04-11 RX ORDER — CARBOPROST TROMETHAMINE 250 UG/ML
250 INJECTION, SOLUTION INTRAMUSCULAR ONCE AS NEEDED
Status: DISCONTINUED | OUTPATIENT
Start: 2024-04-11 | End: 2024-04-14 | Stop reason: HOSPADM

## 2024-04-11 RX ORDER — MIDAZOLAM HYDROCHLORIDE 1 MG/ML
INJECTION INTRAMUSCULAR; INTRAVENOUS AS NEEDED
Status: DISCONTINUED | OUTPATIENT
Start: 2024-04-11 | End: 2024-04-11

## 2024-04-11 RX ORDER — FAMOTIDINE 10 MG/ML
INJECTION INTRAVENOUS AS NEEDED
Status: DISCONTINUED | OUTPATIENT
Start: 2024-04-11 | End: 2024-04-11

## 2024-04-11 RX ORDER — ACETAMINOPHEN 120 MG/1
SUPPOSITORY RECTAL AS NEEDED
Status: DISCONTINUED | OUTPATIENT
Start: 2024-04-11 | End: 2024-04-11

## 2024-04-11 RX ORDER — ONDANSETRON HYDROCHLORIDE 2 MG/ML
4 INJECTION, SOLUTION INTRAVENOUS EVERY 6 HOURS PRN
Status: DISCONTINUED | OUTPATIENT
Start: 2024-04-11 | End: 2024-04-14 | Stop reason: HOSPADM

## 2024-04-11 RX ORDER — MORPHINE SULFATE 0.5 MG/ML
INJECTION, SOLUTION EPIDURAL; INTRATHECAL; INTRAVENOUS AS NEEDED
Status: DISCONTINUED | OUTPATIENT
Start: 2024-04-11 | End: 2024-04-11

## 2024-04-11 RX ORDER — LABETALOL HYDROCHLORIDE 5 MG/ML
20 INJECTION, SOLUTION INTRAVENOUS ONCE AS NEEDED
Status: DISCONTINUED | OUTPATIENT
Start: 2024-04-11 | End: 2024-04-14 | Stop reason: HOSPADM

## 2024-04-11 RX ORDER — FAMOTIDINE 20 MG/1
20 TABLET, FILM COATED ORAL 2 TIMES DAILY
Status: DISCONTINUED | OUTPATIENT
Start: 2024-04-11 | End: 2024-04-11

## 2024-04-11 RX ORDER — MAGNESIUM SULFATE HEPTAHYDRATE 40 MG/ML
2 INJECTION, SOLUTION INTRAVENOUS CONTINUOUS
Status: DISCONTINUED | OUTPATIENT
Start: 2024-04-12 | End: 2024-04-12

## 2024-04-11 RX ORDER — ONDANSETRON 4 MG/1
4 TABLET, FILM COATED ORAL EVERY 6 HOURS PRN
Status: DISCONTINUED | OUTPATIENT
Start: 2024-04-11 | End: 2024-04-14 | Stop reason: HOSPADM

## 2024-04-11 RX ORDER — FENTANYL CITRATE 50 UG/ML
INJECTION, SOLUTION INTRAMUSCULAR; INTRAVENOUS AS NEEDED
Status: DISCONTINUED | OUTPATIENT
Start: 2024-04-11 | End: 2024-04-11

## 2024-04-11 RX ORDER — ADHESIVE BANDAGE
10 BANDAGE TOPICAL
Status: DISCONTINUED | OUTPATIENT
Start: 2024-04-11 | End: 2024-04-14 | Stop reason: HOSPADM

## 2024-04-11 RX ORDER — LOPERAMIDE HYDROCHLORIDE 2 MG/1
4 CAPSULE ORAL EVERY 2 HOUR PRN
Status: DISCONTINUED | OUTPATIENT
Start: 2024-04-11 | End: 2024-04-14 | Stop reason: HOSPADM

## 2024-04-11 RX ORDER — CALCIUM GLUCONATE 98 MG/ML
1 INJECTION, SOLUTION INTRAVENOUS ONCE AS NEEDED
Status: DISCONTINUED | OUTPATIENT
Start: 2024-04-11 | End: 2024-04-11

## 2024-04-11 RX ORDER — KETOROLAC TROMETHAMINE 30 MG/ML
30 INJECTION, SOLUTION INTRAMUSCULAR; INTRAVENOUS EVERY 6 HOURS
Status: COMPLETED | OUTPATIENT
Start: 2024-04-12 | End: 2024-04-12

## 2024-04-11 RX ORDER — TRANEXAMIC ACID 100 MG/ML
1000 INJECTION, SOLUTION INTRAVENOUS ONCE AS NEEDED
Status: DISCONTINUED | OUTPATIENT
Start: 2024-04-11 | End: 2024-04-14 | Stop reason: HOSPADM

## 2024-04-11 RX ORDER — DIPHENHYDRAMINE HYDROCHLORIDE 50 MG/ML
25 INJECTION INTRAMUSCULAR; INTRAVENOUS ONCE
Status: DISCONTINUED | OUTPATIENT
Start: 2024-04-12 | End: 2024-04-12

## 2024-04-11 RX ORDER — TRANEXAMIC ACID 100 MG/ML
INJECTION, SOLUTION INTRAVENOUS AS NEEDED
Status: DISCONTINUED | OUTPATIENT
Start: 2024-04-11 | End: 2024-04-11

## 2024-04-11 RX ORDER — NIFEDIPINE 10 MG/1
10 CAPSULE ORAL ONCE AS NEEDED
Status: DISCONTINUED | OUTPATIENT
Start: 2024-04-11 | End: 2024-04-14 | Stop reason: HOSPADM

## 2024-04-11 RX ORDER — BISACODYL 10 MG/1
10 SUPPOSITORY RECTAL DAILY PRN
Status: DISCONTINUED | OUTPATIENT
Start: 2024-04-11 | End: 2024-04-14 | Stop reason: HOSPADM

## 2024-04-11 RX ORDER — DEXMEDETOMIDINE IN 0.9 % NACL 20 MCG/5ML
SYRINGE (ML) INTRAVENOUS AS NEEDED
Status: DISCONTINUED | OUTPATIENT
Start: 2024-04-11 | End: 2024-04-11

## 2024-04-11 RX ORDER — CEFAZOLIN 1 G/1
INJECTION, POWDER, FOR SOLUTION INTRAVENOUS AS NEEDED
Status: DISCONTINUED | OUTPATIENT
Start: 2024-04-11 | End: 2024-04-11

## 2024-04-11 RX ORDER — IBUPROFEN 600 MG/1
600 TABLET ORAL EVERY 6 HOURS
Status: DISCONTINUED | OUTPATIENT
Start: 2024-04-12 | End: 2024-04-14 | Stop reason: HOSPADM

## 2024-04-11 RX ORDER — CALCIUM GLUCONATE 98 MG/ML
1 INJECTION, SOLUTION INTRAVENOUS ONCE AS NEEDED
Status: DISCONTINUED | OUTPATIENT
Start: 2024-04-11 | End: 2024-04-12

## 2024-04-11 RX ORDER — OXYTOCIN/0.9 % SODIUM CHLORIDE 30/500 ML
60 PLASTIC BAG, INJECTION (ML) INTRAVENOUS ONCE AS NEEDED
Status: DISCONTINUED | OUTPATIENT
Start: 2024-04-11 | End: 2024-04-14 | Stop reason: HOSPADM

## 2024-04-11 RX ORDER — LIDOCAINE HCL/EPINEPHRINE/PF 2%-1:200K
VIAL (ML) INJECTION AS NEEDED
Status: DISCONTINUED | OUTPATIENT
Start: 2024-04-11 | End: 2024-04-11

## 2024-04-11 RX ORDER — SIMETHICONE 80 MG
80 TABLET,CHEWABLE ORAL 4 TIMES DAILY PRN
Status: DISCONTINUED | OUTPATIENT
Start: 2024-04-11 | End: 2024-04-14 | Stop reason: HOSPADM

## 2024-04-11 RX ORDER — ENOXAPARIN SODIUM 100 MG/ML
60 INJECTION SUBCUTANEOUS EVERY 24 HOURS
Status: DISCONTINUED | OUTPATIENT
Start: 2024-04-12 | End: 2024-04-14 | Stop reason: HOSPADM

## 2024-04-11 RX ADMIN — Medication 4 MCG: at 21:13

## 2024-04-11 RX ADMIN — DEXAMETHASONE SODIUM PHOSPHATE 4 MG: 4 INJECTION INTRA-ARTICULAR; INTRALESIONAL; INTRAMUSCULAR; INTRAVENOUS; SOFT TISSUE at 21:49

## 2024-04-11 RX ADMIN — Medication 4 MCG: at 21:17

## 2024-04-11 RX ADMIN — LIDOCAINE HYDROCHLORIDE,EPINEPHRINE BITARTRATE 3 ML: 20; .005 INJECTION, SOLUTION EPIDURAL; INFILTRATION; INTRACAUDAL; PERINEURAL at 21:31

## 2024-04-11 RX ADMIN — Medication 4 MCG: at 21:15

## 2024-04-11 RX ADMIN — MAGNESIUM SULFATE HEPTAHYDRATE 2 G/HR: 40 INJECTION, SOLUTION INTRAVENOUS at 20:00

## 2024-04-11 RX ADMIN — CEFAZOLIN 2 G: 1 INJECTION, POWDER, FOR SOLUTION INTRAMUSCULAR; INTRAVENOUS at 20:59

## 2024-04-11 RX ADMIN — FAMOTIDINE 20 MG: 20 TABLET, FILM COATED ORAL at 09:08

## 2024-04-11 RX ADMIN — FENTANYL CITRATE 100 MCG: 50 INJECTION, SOLUTION INTRAMUSCULAR; INTRAVENOUS at 20:51

## 2024-04-11 RX ADMIN — CEFAZOLIN 2 G: 1 INJECTION, POWDER, FOR SOLUTION INTRAMUSCULAR; INTRAVENOUS at 21:48

## 2024-04-11 RX ADMIN — FENTANYL CITRATE 100 MCG: 50 INJECTION, SOLUTION INTRAMUSCULAR; INTRAVENOUS at 14:52

## 2024-04-11 RX ADMIN — OXYTOCIN 600 MILLI-UNITS/MIN: 10 INJECTION, SOLUTION INTRAMUSCULAR; INTRAVENOUS at 21:19

## 2024-04-11 RX ADMIN — ACETAMINOPHEN 650 MG: 120 SUPPOSITORY RECTAL at 21:56

## 2024-04-11 RX ADMIN — ONDANSETRON 4 MG: 2 INJECTION INTRAMUSCULAR; INTRAVENOUS at 13:52

## 2024-04-11 RX ADMIN — TRANEXAMIC ACID 1000 MG: 100 INJECTION, SOLUTION INTRAVENOUS at 21:16

## 2024-04-11 RX ADMIN — AZITHROMYCIN 500 MG: 500 INJECTION, POWDER, LYOPHILIZED, FOR SOLUTION INTRAVENOUS at 21:03

## 2024-04-11 RX ADMIN — Medication 4 MCG: at 21:16

## 2024-04-11 RX ADMIN — Medication 4 MCG: at 21:19

## 2024-04-11 RX ADMIN — LABETALOL HYDROCHLORIDE 20 MG: 5 INJECTION, SOLUTION INTRAVENOUS at 19:34

## 2024-04-11 RX ADMIN — LIDOCAINE HYDROCHLORIDE,EPINEPHRINE BITARTRATE 5 ML: 20; .005 INJECTION, SOLUTION EPIDURAL; INFILTRATION; INTRACAUDAL; PERINEURAL at 20:49

## 2024-04-11 RX ADMIN — ONDANSETRON 4 MG: 2 INJECTION INTRAMUSCULAR; INTRAVENOUS at 21:02

## 2024-04-11 RX ADMIN — KETOROLAC TROMETHAMINE 30 MG: 30 INJECTION, SOLUTION INTRAMUSCULAR; INTRAVENOUS at 21:46

## 2024-04-11 RX ADMIN — SODIUM CHLORIDE, SODIUM LACTATE, POTASSIUM CHLORIDE, AND CALCIUM CHLORIDE: 600; 310; 30; 20 INJECTION, SOLUTION INTRAVENOUS at 20:44

## 2024-04-11 RX ADMIN — MORPHINE SULFATE 2.5 MG: 0.5 INJECTION EPIDURAL; INTRATHECAL; INTRAVENOUS at 21:16

## 2024-04-11 RX ADMIN — Medication 30 MG: at 21:37

## 2024-04-11 RX ADMIN — Medication 2 MILLI-UNITS/MIN: at 01:30

## 2024-04-11 RX ADMIN — MIDAZOLAM HYDROCHLORIDE 2 MG: 1 INJECTION, SOLUTION INTRAMUSCULAR; INTRAVENOUS at 21:36

## 2024-04-11 RX ADMIN — Medication 14 ML/HR: at 19:01

## 2024-04-11 RX ADMIN — LIDOCAINE HYDROCHLORIDE,EPINEPHRINE BITARTRATE 1 ML: 20; .005 INJECTION, SOLUTION EPIDURAL; INFILTRATION; INTRACAUDAL; PERINEURAL at 21:35

## 2024-04-11 RX ADMIN — MORPHINE SULFATE 2.5 MG: 0.5 INJECTION, SOLUTION EPIDURAL; INTRATHECAL; INTRAVENOUS at 21:18

## 2024-04-11 RX ADMIN — METOCLOPRAMIDE 10 MG: 5 INJECTION, SOLUTION INTRAMUSCULAR; INTRAVENOUS at 21:10

## 2024-04-11 RX ADMIN — NIFEDIPINE 30 MG: 30 TABLET, FILM COATED, EXTENDED RELEASE ORAL at 19:48

## 2024-04-11 RX ADMIN — LIDOCAINE HYDROCHLORIDE,EPINEPHRINE BITARTRATE 5 ML: 20; .005 INJECTION, SOLUTION EPIDURAL; INFILTRATION; INTRACAUDAL; PERINEURAL at 20:44

## 2024-04-11 RX ADMIN — FAMOTIDINE 20 MG: 10 INJECTION, SOLUTION INTRAVENOUS at 21:10

## 2024-04-11 ASSESSMENT — PAIN SCALES - GENERAL
PAINLEVEL_OUTOF10: 0 - NO PAIN
PAIN_LEVEL: 0
PAINLEVEL_OUTOF10: 0 - NO PAIN
PAINLEVEL_OUTOF10: 0 - NO PAIN
PAINLEVEL_OUTOF10: 5 - MODERATE PAIN
PAINLEVEL_OUTOF10: 0 - NO PAIN
PAINLEVEL_OUTOF10: 0 - NO PAIN
PAINLEVEL_OUTOF10: 2
PAINLEVEL_OUTOF10: 0 - NO PAIN
PAINLEVEL_OUTOF10: 6
PAINLEVEL_OUTOF10: 0 - NO PAIN
PAINLEVEL_OUTOF10: 3
PAINLEVEL_OUTOF10: 0 - NO PAIN
PAINLEVEL_OUTOF10: 4
PAINLEVEL_OUTOF10: 0 - NO PAIN
PAINLEVEL_OUTOF10: 7
PAINLEVEL_OUTOF10: 3
PAINLEVEL_OUTOF10: 0 - NO PAIN
PAINLEVEL_OUTOF10: 3
PAINLEVEL_OUTOF10: 0 - NO PAIN
PAINLEVEL_OUTOF10: 0 - NO PAIN
PAINLEVEL_OUTOF10: 4

## 2024-04-11 NOTE — SIGNIFICANT EVENT
Labor Progress Note    Subjective: Feeling more pressure and like pushing would relieve the pressure    Objective:  Vitals: /76   Pulse 96   Temp 36.8 °C (98.2 °F) (Temporal)   Resp 18   LMP 07/14/2023   SpO2 97%     Cervical Exam  Dilation: 4  Effacement (%): 80  Fetal Station: -3    Fetal Assessment - A  Baseline Fetal Heart Rate (bpm): 140 bpm  Baseline Classification: Normal  Variability: Moderate (Between 6 and 25 BPM)  Pattern: Accelerations  FHR Category: Category I     Fetal Assessment - B  Baseline Fetal Heart Rate (bpm): 140 bpm  Baseline Classification: Normal  Variability: Moderate (Between 6 and 25 BPM)  Pattern: Accelerations  FHR Category: Category I     Contraction Frequency: 1-3    Assessment/Plan:  Induction of Labor  - S/p AROM, on pit, latent labor. Unchanged from prior exam and not yet in active labor at 12hrs s/p AROM + pit. Discussed that CS would be a reasonable option at this point. Discussed that at 18hrs if not yet in active labor would more strongly recommend CS for increased risks of infection, bleeding, NRFHT. Pt expresses understanding and would like to continue IOL. Plan to recheck at 2030 (18hrs) or sooner if change in clinical status  - Q2-3min ctx, FHT reassuring x2, continue pit per protocol  - Epidural infusing     Di/Di Twins  - Continue CEFM, cat 1 x2 currently     Elevated BP  - Mild range BP x2, not 4hrs apart. Currently normotensive  - HELLP labs neg, no P:C ordered given s/p AROM    D/w Dr. Eliseo Rogel MD  PGY-3, Obstetrics and Gynecology

## 2024-04-11 NOTE — SIGNIFICANT EVENT
To bedside now that CRB out, [place] with ctx q2min. FHT for Twin A and Twin B category 1 with baseline 140/mod/+accel/-decel  for Twin A and 150/mod/+accel/-decel for Twin B. Pt using nitrous with good pain control. Desires epidural before AROM but okay with SVE    SVE: 3/50/-3    Latent labor, for pit per protocol at 23:45. Continue to monitor for cervical change.    Dr. Valiente at bedside to meet patient    Mathew Jensen MD PGY-3

## 2024-04-11 NOTE — ANESTHESIA PROCEDURE NOTES
Epidural Block    Patient location during procedure: OB  Start time: 4/10/2024 11:20 PM  End time: 4/10/2024 11:37 PM  Reason for block: labor analgesia  Staffing  Performed: EMANI   Authorized by: EMANI Westbrook    Performed by: EMANI Westbrook    Preanesthetic Checklist  Completed: patient identified, IV checked, risks and benefits discussed, surgical consent, pre-op evaluation, timeout performed and sterile techniques followed  Block Timeout  RN/Licensed healthcare professional reads aloud to the Anesthesia provider and entire team: Patient identity, procedure with side and site, patient position, and as applicable the availability of implants/special equipment/special requirements.  Patient on coagulant treatment: no  Timeout performed at: 4/10/2024 11:20 PM  Block Placement  Patient position: sitting  Prep: ChloraPrep  Sterility prep: mask, gloves, drape and cap  Sedation level: no sedation  Patient monitoring: heart rate, continuous pulse oximetry and blood pressure  Approach: midline  Local numbing: lidocaine 1% to skin and subcutaneous tissues  Vertebral space: lumbar  Lumbar location: L3-L4  Epidural  Loss of resistance technique: saline  Guidance: landmark technique        Needle  Needle type: Tuohy   Needle gauge: 17  Needle length: 8.9cm  Needle insertion depth: 6.5 cm  Catheter type: multi-orifice  Catheter size: 19 G  Catheter at skin depth: 11.5 cm  Catheter securement method: clear occlusive dressing and liquid medical adhesive    Test dose: lidocaine 1.5% with epinephrine 1-to-200,000  Test dose: lidocaine 1.5% with epinephrine 1-to-200,000  Test dose result: no positive test dose    PCEA  Medication concentration used: 0.044% Bupivacaine with 1.25 mcg/mL Fentanyl and [id number] Epinephrine  Dose (mL): 10  Lockout (minutes): 15  1-Hour Limit (boluses/hr): 4  Basal Rate: 14        Assessment  Sensory level: T10 bilateral  Block outcome: patient comfortable  Number of attempts:  1  Events: no positive test dose  Procedure assessment: patient tolerated procedure well with no immediate complications

## 2024-04-11 NOTE — SIGNIFICANT EVENT
To bedside. Pt now s/p epidural. Pit held due to regular ctx on toco. Pain well controlled with no pressure.     SVE: 3/50/-3, unchanged from prior exam   AROM Clear fluid    FHT cat 1, baseline 140/mod/+accel/-decel  Twin A, baseline 140/mod/+accel/-decel Twin B    Latent Labor  -Will start pit per protocol given SVE unchanged  -Continue to monitor for cervical change    Mathew Jensen MD

## 2024-04-11 NOTE — SIGNIFICANT EVENT
Checked in on patient at bedside. Doing well, epidural infusing. Pitocin infusing at 4u currently, [place] with ctx q2min. Patient denies pressure with ctx. FHT currently cat 1 for both Twin A and Twin B with no current tracing concerns. Both babies tracing well on fetal monitor.     Latent Labor  -Continue pit per protocol, increase when ctx pattern appropriate.  -Monitor for cervical change    1x MRBP  -Pt with initial MRBP at 0045 on 4/11  -HELLP labs wnl, P:C deferred given s/p AROM  -IF one more mild range BP, will meet criteria for gHTN  -Pt currently asymptomatic     Mathew Jensen MD PGY-3

## 2024-04-11 NOTE — SIGNIFICANT EVENT
Labor Progress Note    Subjective: Feeling more pressure    Objective:  Vitals: /83   Pulse 97   Temp 36.9 °C (98.4 °F) (Temporal)   Resp 18   LMP 07/14/2023   SpO2 98%     Cervical Exam  Dilation: 4  Effacement (%): 80  Fetal Station: -3    Fetal Assessment - A  Baseline Fetal Heart Rate (bpm): 145 bpm  Baseline Classification: Normal  Variability: Moderate (Between 6 and 25 BPM)  Pattern: Accelerations  FHR Category: Category I     Fetal Assessment - B  Baseline Fetal Heart Rate (bpm): 140 bpm  Baseline Classification: Normal  Variability: Moderate (Between 6 and 25 BPM)  Pattern: Accelerations  FHR Category: Category I     Contraction Frequency: 1-3    Assessment/Plan:  Induction of Labor  - S/p AROM, on pit, latent labor  - Q2-3min ctx, FHT reassuring x2, continue pit per protocol  - Epidural infusing     Di/Di Twins  - Continue CEFM, cat 1 x2 currently     Elevated BP  - Mild range BP x2, not 4hrs apart. Currently normotensive  - HELLP labs neg, no P:C ordered given s/p AROM     D/w Dr. Eliseo Rogel MD  PGY-3, Obstetrics and Gynecology

## 2024-04-11 NOTE — PROGRESS NOTES
Intrapartum Progress Note    Assessment/Plan   Kindra Hunt is a 23 y.o.  at 38w1d. GLEN: 2024, by Ultrasound undergoing IOL for di-di twins    Induction of Labor  - S/p AROM, on pit, latent labor as of last exam at 0530  - Q2-3min ctx, FHT reassuring x2, continue pit per protocol  - Epidural infusing  - Starting Hgb 13, T&C 1UPRBC for risk factors  - GBS neg, no ppx needed     Di/Di Twins  - Last Growth US 3/29 Twin A, EFW 2426g (12%), AC 51%; Twin B EFW 2721 (34%), AC 88%. 10% discordance  - Verified pt's desire for CS rather than breech extraction if twin B is nonvertex after delivery of twin A. Discussed that CS comes with higher maternal risks, particularly of infection, bleeding, injury to surrounding structures, and greater recovery time. When looking at outcomes of breech extraction vs CS, there were not significant differences in  outcomes. Pt likely wants CS but will consider breech extraction  - Continue CEFM, cat 1 x2 currently    Elevated BP  - Mild range BP x2, not 4hrs apart  - HELLP labs neg, no P:C ordered given s/p AROM     Postpartum Care  - Breastfeeding  - PPBC: declines     Seen and d/w Dr. Eliseo Rogel MD  PGY-3, Obstetrics and Gynecology    Subjective   Feeling intermittent bladder pressure. Otherwise doing well    Objective   Last Vitals:  Temp Pulse Resp BP MAP Pulse Ox   36.7 °C (98.1 °F) 86 18 129/84   98 %     Vitals Min/Max Last 24 Hours:  Temp  Min: 36.2 °C (97.2 °F)  Max: 36.7 °C (98.1 °F)  Pulse  Min: 83  Max: 151  Resp  Min: 16  Max: 20  BP  Min: 118/57  Max: 153/68    Intake/Output:  No intake or output data in the 24 hours ending 24 0755    Physical Examination:  General Appearance: no acute distress  Skin: no rashes or lesions appreciated  Lungs: normal work of breathing  Heart: warm, well perfused  Abdomen: gravid  Extremities: no edema appreciated  Musculoskeletal: normal ROM  Neurologic: no gross deficits  Psych exam: normal mood and  affect    Lab Review:  Lab Results   Component Value Date    WBC 8.8 04/10/2024    HGB 13.0 04/10/2024    HCT 39.7 04/10/2024     04/10/2024     Lab Results   Component Value Date    GLUCOSE 70 (L) 04/10/2024     04/10/2024    K 4.1 04/10/2024     (H) 04/10/2024    CO2 17 (L) 04/10/2024    ANIONGAP 18 04/10/2024    BUN 6 04/10/2024    CREATININE 0.44 (L) 04/10/2024    EGFR >90 04/10/2024    CALCIUM 8.7 04/10/2024    ALBUMIN 3.1 (L) 04/10/2024    PROT 5.5 (L) 04/10/2024    ALKPHOS 226 (H) 04/10/2024    ALT 5 (L) 04/10/2024    AST 13 04/10/2024    BILITOT 0.4 04/10/2024

## 2024-04-11 NOTE — ANESTHESIA PREPROCEDURE EVALUATION
Patient: Kindra Hunt    Evaluation Method: In-person visit    Procedure Information    Date: 04/10/24  Procedure: Labor Consult         Relevant Problems   Anesthesia (within normal limits)      Cardiac (within normal limits)      Pulmonary (within normal limits)      Neuro (within normal limits)      GI   (+) Gastroesophageal reflux disease without esophagitis      /Renal (within normal limits)      Liver (within normal limits)      Endocrine (within normal limits)      Hematology (within normal limits)      Musculoskeletal (within normal limits)      HEENT (within normal limits)      GYN   (+) 37 weeks gestation of pregnancy   (+) Dichorionic diamniotic twin pregnancy in third trimester       Clinical information reviewed:   Tobacco  Allergies  Meds   Med Hx  Surg Hx   Fam Hx          NPO Detail:  No data recorded     OB/GYN     Physical Exam    Airway  Mallampati: II  TM distance: >3 FB  Neck ROM: full     Cardiovascular    Dental - normal exam     Pulmonary    Abdominal            Anesthesia Plan    History of general anesthesia?: no  History of complications of general anesthesia?: no    ASA 2     epidural     The patient is not a current smoker.  Patient was not previously instructed to abstain from smoking on day of procedure.    Anesthetic plan and risks discussed with patient.  Use of blood products discussed with patient who consented to blood products.    Plan discussed with CAA.

## 2024-04-11 NOTE — SIGNIFICANT EVENT
To bedside. PT feeling some mild pressure with ctx but no pain. Epidural infusing and patient resting comfortably. Okay with SVE. Denies HA RUQ pain or blurry vision     SVE: 4/60/-3    FHT cat 1, baseline 140/mod/+accel/-decel  Twin A, baseline 140/mod/+accel/-decel Twin B     Latent Labor  -Continue pitocin per protocol, increased to 6u  -Continue to monitor for cervical change     Mathew Jensen MD PGY-3

## 2024-04-11 NOTE — PROGRESS NOTES
Intrapartum Progress Note    Assessment/Plan   Kindra Hunt is a 23 y.o.  at 38w1d. GLEN: 2024, by Ultrasound undergoing IOL for di-di twins    Induction of Labor  - S/p AROM, on pit, latent labor as of last exam at 1425  - Q2-3min ctx, FHT reassuring x2, continue pit per protocol, currently at 26  - Will plan to reassess at 20:30 for active labor on SVE, if unchanged, will be 18hrs of AROM and pitocin. Will discuss possible  section if unchanged. Patient aware.  - Epidural infusing  - Starting Hgb 13, T&C 1UPRBC for risk factors  - GBS neg, no ppx needed     Di/Di Twins  - Last Growth US 3/29 Twin A, EFW 2426g (12%), AC 51%; Twin B EFW 2721 (34%), AC 88%. 10% discordance  - Verified pt's desire for CS rather than breech extraction if twin B is nonvertex after delivery of twin A. Discussed that CS comes with higher maternal risks, particularly of infection, bleeding, injury to surrounding structures, and greater recovery time. When looking at outcomes of breech extraction vs CS, there were not significant differences in  outcomes. Pt likely wants CS but will consider breech extraction  - Continue CEFM, cat 1 x2 currently    Elevated BP  - Mild range BP x2, not 4hrs apart  - HELLP labs neg, no P:C ordered given s/p AROM  - If additional mild range, gHTN     Postpartum Care  - Breastfeeding  - PPBC: declines     Seen and d/w Dr. Kirk Jensen MD PGY-3    Subjective   Feeling intermittent bladder pressure. Otherwise doing well    Objective   Last Vitals:  Temp Pulse Resp BP MAP Pulse Ox   36.8 °C (98.2 °F) 102 18 125/68   97 %     Vitals Min/Max Last 24 Hours:  Temp  Min: 36.3 °C (97.3 °F)  Max: 36.9 °C (98.4 °F)  Pulse  Min: 83  Max: 151  Resp  Min: 16  Max: 18  BP  Min: 118/64  Max: 153/68    Intake/Output:    Intake/Output Summary (Last 24 hours) at 2024 1737  Last data filed at 2024 1347  Gross per 24 hour   Intake --   Output 565 ml   Net -565 ml       Physical  Examination:  General Appearance: no acute distress  Skin: no rashes or lesions appreciated  Lungs: normal work of breathing  Heart: warm, well perfused  Abdomen: gravid  Extremities: no edema appreciated  Musculoskeletal: normal ROM  Neurologic: no gross deficits  Psych exam: normal mood and affect    Lab Review:  Lab Results   Component Value Date    WBC 8.8 04/10/2024    HGB 13.0 04/10/2024    HCT 39.7 04/10/2024     04/10/2024     Lab Results   Component Value Date    GLUCOSE 70 (L) 04/10/2024     04/10/2024    K 4.1 04/10/2024     (H) 04/10/2024    CO2 17 (L) 04/10/2024    ANIONGAP 18 04/10/2024    BUN 6 04/10/2024    CREATININE 0.44 (L) 04/10/2024    EGFR >90 04/10/2024    CALCIUM 8.7 04/10/2024    ALBUMIN 3.1 (L) 04/10/2024    PROT 5.5 (L) 04/10/2024    ALKPHOS 226 (H) 04/10/2024    ALT 5 (L) 04/10/2024    AST 13 04/10/2024    BILITOT 0.4 04/10/2024

## 2024-04-12 LAB
ERYTHROCYTE [DISTWIDTH] IN BLOOD BY AUTOMATED COUNT: 13.2 % (ref 11.5–14.5)
HCT VFR BLD AUTO: 32.5 % (ref 36–46)
HGB BLD-MCNC: 11.1 G/DL (ref 12–16)
MCH RBC QN AUTO: 33.5 PG (ref 26–34)
MCHC RBC AUTO-ENTMCNC: 34.2 G/DL (ref 32–36)
MCV RBC AUTO: 98 FL (ref 80–100)
NRBC BLD-RTO: 0 /100 WBCS (ref 0–0)
PLATELET # BLD AUTO: 258 X10*3/UL (ref 150–450)
RBC # BLD AUTO: 3.31 X10*6/UL (ref 4–5.2)
WBC # BLD AUTO: 21.1 X10*3/UL (ref 4.4–11.3)

## 2024-04-12 PROCEDURE — 36415 COLL VENOUS BLD VENIPUNCTURE: CPT | Performed by: STUDENT IN AN ORGANIZED HEALTH CARE EDUCATION/TRAINING PROGRAM

## 2024-04-12 PROCEDURE — 2500000002 HC RX 250 W HCPCS SELF ADMINISTERED DRUGS (ALT 637 FOR MEDICARE OP, ALT 636 FOR OP/ED): Performed by: NURSE PRACTITIONER

## 2024-04-12 PROCEDURE — 2500000004 HC RX 250 GENERAL PHARMACY W/ HCPCS (ALT 636 FOR OP/ED): Performed by: NURSE PRACTITIONER

## 2024-04-12 PROCEDURE — 2500000004 HC RX 250 GENERAL PHARMACY W/ HCPCS (ALT 636 FOR OP/ED): Performed by: STUDENT IN AN ORGANIZED HEALTH CARE EDUCATION/TRAINING PROGRAM

## 2024-04-12 PROCEDURE — 85027 COMPLETE CBC AUTOMATED: CPT | Performed by: STUDENT IN AN ORGANIZED HEALTH CARE EDUCATION/TRAINING PROGRAM

## 2024-04-12 PROCEDURE — 99199 UNLISTED SPECIAL SVC PX/RPRT: CPT

## 2024-04-12 PROCEDURE — 2500000001 HC RX 250 WO HCPCS SELF ADMINISTERED DRUGS (ALT 637 FOR MEDICARE OP): Performed by: STUDENT IN AN ORGANIZED HEALTH CARE EDUCATION/TRAINING PROGRAM

## 2024-04-12 PROCEDURE — 1100000001 HC PRIVATE ROOM DAILY

## 2024-04-12 RX ORDER — SODIUM CHLORIDE, SODIUM LACTATE, POTASSIUM CHLORIDE, CALCIUM CHLORIDE 600; 310; 30; 20 MG/100ML; MG/100ML; MG/100ML; MG/100ML
75 INJECTION, SOLUTION INTRAVENOUS CONTINUOUS
Status: DISCONTINUED | OUTPATIENT
Start: 2024-04-12 | End: 2024-04-14 | Stop reason: HOSPADM

## 2024-04-12 RX ORDER — NIFEDIPINE 30 MG/1
30 TABLET, FILM COATED, EXTENDED RELEASE ORAL EVERY 24 HOURS
Status: DISCONTINUED | OUTPATIENT
Start: 2024-04-12 | End: 2024-04-14 | Stop reason: HOSPADM

## 2024-04-12 RX ORDER — GUAIFENESIN 600 MG/1
600 TABLET, EXTENDED RELEASE ORAL 2 TIMES DAILY PRN
Status: DISCONTINUED | OUTPATIENT
Start: 2024-04-12 | End: 2024-04-14 | Stop reason: HOSPADM

## 2024-04-12 RX ORDER — CALCIUM GLUCONATE 98 MG/ML
1 INJECTION, SOLUTION INTRAVENOUS ONCE AS NEEDED
Status: DISCONTINUED | OUTPATIENT
Start: 2024-04-12 | End: 2024-04-14 | Stop reason: HOSPADM

## 2024-04-12 RX ORDER — MAGNESIUM SULFATE HEPTAHYDRATE 40 MG/ML
2 INJECTION, SOLUTION INTRAVENOUS CONTINUOUS
Status: DISCONTINUED | OUTPATIENT
Start: 2024-04-12 | End: 2024-04-14 | Stop reason: HOSPADM

## 2024-04-12 RX ADMIN — SODIUM CHLORIDE, POTASSIUM CHLORIDE, SODIUM LACTATE AND CALCIUM CHLORIDE 75 ML/HR: 600; 310; 30; 20 INJECTION, SOLUTION INTRAVENOUS at 10:40

## 2024-04-12 RX ADMIN — KETOROLAC TROMETHAMINE 30 MG: 30 INJECTION, SOLUTION INTRAMUSCULAR at 16:38

## 2024-04-12 RX ADMIN — KETOROLAC TROMETHAMINE 30 MG: 30 INJECTION, SOLUTION INTRAMUSCULAR at 10:37

## 2024-04-12 RX ADMIN — MAGNESIUM SULFATE HEPTAHYDRATE 2 G/HR: 40 INJECTION, SOLUTION INTRAVENOUS at 14:51

## 2024-04-12 RX ADMIN — ACETAMINOPHEN 975 MG: 325 TABLET ORAL at 22:47

## 2024-04-12 RX ADMIN — NIFEDIPINE 30 MG: 30 TABLET, FILM COATED, EXTENDED RELEASE ORAL at 19:55

## 2024-04-12 RX ADMIN — ACETAMINOPHEN 975 MG: 325 TABLET ORAL at 04:34

## 2024-04-12 RX ADMIN — KETOROLAC TROMETHAMINE 30 MG: 30 INJECTION, SOLUTION INTRAMUSCULAR at 04:35

## 2024-04-12 RX ADMIN — MAGNESIUM SULFATE HEPTAHYDRATE 2 G/HR: 40 INJECTION, SOLUTION INTRAVENOUS at 03:38

## 2024-04-12 RX ADMIN — ENOXAPARIN SODIUM 60 MG: 100 INJECTION SUBCUTANEOUS at 22:47

## 2024-04-12 RX ADMIN — POLYETHYLENE GLYCOL 3350 17 G: 17 POWDER, FOR SOLUTION ORAL at 22:47

## 2024-04-12 RX ADMIN — ACETAMINOPHEN 975 MG: 325 TABLET ORAL at 10:37

## 2024-04-12 RX ADMIN — IBUPROFEN 600 MG: 600 TABLET, FILM COATED ORAL at 22:47

## 2024-04-12 RX ADMIN — ACETAMINOPHEN 975 MG: 325 TABLET ORAL at 16:38

## 2024-04-12 SDOH — HEALTH STABILITY: MENTAL HEALTH: WERE YOU ABLE TO COMPLETE ALL THE BEHAVIORAL HEALTH SCREENINGS?: YES

## 2024-04-12 SDOH — SOCIAL STABILITY: SOCIAL INSECURITY: ARE YOU OR HAVE YOU BEEN THREATENED OR ABUSED PHYSICALLY, EMOTIONALLY, OR SEXUALLY BY ANYONE?: NO

## 2024-04-12 SDOH — ECONOMIC STABILITY: HOUSING INSECURITY: DO YOU FEEL UNSAFE GOING BACK TO THE PLACE WHERE YOU ARE LIVING?: NO

## 2024-04-12 SDOH — HEALTH STABILITY: MENTAL HEALTH: SUICIDAL BEHAVIOR (LIFETIME): NO

## 2024-04-12 SDOH — SOCIAL STABILITY: SOCIAL INSECURITY: VERBAL ABUSE: DENIES

## 2024-04-12 SDOH — SOCIAL STABILITY: SOCIAL INSECURITY: PHYSICAL ABUSE: DENIES

## 2024-04-12 SDOH — HEALTH STABILITY: MENTAL HEALTH: NON-SPECIFIC ACTIVE SUICIDAL THOUGHTS (PAST 1 MONTH): NO

## 2024-04-12 SDOH — HEALTH STABILITY: MENTAL HEALTH: HAVE YOU USED ANY SUBSTANCES (CANABIS, COCAINE, HEROIN, HALLUCINOGENS, INHALANTS, ETC.) IN THE PAST 12 MONTHS?: NO

## 2024-04-12 SDOH — SOCIAL STABILITY: SOCIAL INSECURITY: DO YOU FEEL ANYONE HAS EXPLOITED OR TAKEN ADVANTAGE OF YOU FINANCIALLY OR OF YOUR PERSONAL PROPERTY?: NO

## 2024-04-12 SDOH — SOCIAL STABILITY: SOCIAL INSECURITY: HAS ANYONE EVER THREATENED TO HURT YOUR FAMILY OR YOUR PETS?: NO

## 2024-04-12 SDOH — SOCIAL STABILITY: SOCIAL INSECURITY: DOES ANYONE TRY TO KEEP YOU FROM HAVING/CONTACTING OTHER FRIENDS OR DOING THINGS OUTSIDE YOUR HOME?: NO

## 2024-04-12 SDOH — HEALTH STABILITY: MENTAL HEALTH: HAVE YOU USED ANY PRESCRIPTION DRUGS OTHER THAN PRESCRIBED IN THE PAST 12 MONTHS?: NO

## 2024-04-12 SDOH — SOCIAL STABILITY: SOCIAL INSECURITY: ABUSE SCREEN: ADULT

## 2024-04-12 SDOH — SOCIAL STABILITY: SOCIAL INSECURITY: HAVE YOU HAD THOUGHTS OF HARMING ANYONE ELSE?: YES

## 2024-04-12 SDOH — SOCIAL STABILITY: SOCIAL INSECURITY: ARE THERE ANY APPARENT SIGNS OF INJURIES/BEHAVIORS THAT COULD BE RELATED TO ABUSE/NEGLECT?: NO

## 2024-04-12 SDOH — HEALTH STABILITY: MENTAL HEALTH: WISH TO BE DEAD (PAST 1 MONTH): NO

## 2024-04-12 ASSESSMENT — LIFESTYLE VARIABLES
SKIP TO QUESTIONS 9-10: 1
AUDIT-C TOTAL SCORE: 0
AUDIT-C TOTAL SCORE: 0
HOW OFTEN DO YOU HAVE 6 OR MORE DRINKS ON ONE OCCASION: NEVER
HOW OFTEN DO YOU HAVE A DRINK CONTAINING ALCOHOL: NEVER
HOW MANY STANDARD DRINKS CONTAINING ALCOHOL DO YOU HAVE ON A TYPICAL DAY: PATIENT DOES NOT DRINK

## 2024-04-12 ASSESSMENT — PAIN SCALES - GENERAL
PAINLEVEL_OUTOF10: 0 - NO PAIN
PAINLEVEL_OUTOF10: 7
PAINLEVEL_OUTOF10: 0 - NO PAIN
PAINLEVEL_OUTOF10: 6
PAINLEVEL_OUTOF10: 7
PAINLEVEL_OUTOF10: 5 - MODERATE PAIN
PAIN_LEVEL: 0
PAINLEVEL_OUTOF10: 0 - NO PAIN
PAINLEVEL_OUTOF10: 7

## 2024-04-12 ASSESSMENT — PAIN - FUNCTIONAL ASSESSMENT: PAIN_FUNCTIONAL_ASSESSMENT: 0-10

## 2024-04-12 ASSESSMENT — ACTIVITIES OF DAILY LIVING (ADL): LACK_OF_TRANSPORTATION: NO

## 2024-04-12 ASSESSMENT — PATIENT HEALTH QUESTIONNAIRE - PHQ9
1. LITTLE INTEREST OR PLEASURE IN DOING THINGS: NOT AT ALL
SUM OF ALL RESPONSES TO PHQ9 QUESTIONS 1 & 2: 0
2. FEELING DOWN, DEPRESSED OR HOPELESS: NOT AT ALL

## 2024-04-12 ASSESSMENT — PAIN DESCRIPTION - LOCATION: LOCATION: ABDOMEN

## 2024-04-12 ASSESSMENT — PAIN DESCRIPTION - DESCRIPTORS: DESCRIPTORS: SORE

## 2024-04-12 NOTE — PROGRESS NOTES
Intrapartum Progress Note    Assessment/Plan   Kindra Hunt is a 23 y.o.  now POD 1 from pCS for failed IOL in setting of di/di twins.    #sPEC  - Nifed 30 mg daily  - Dx by SRBP requiring IV Lab 20  - PEC labs neg x 1  - Asx  - UOP adequate currently      #Postpartum  - continue routine postpartum care  - pain well controlled on po medications  - dvt risk score   8, for ppx lovenox  - Rh: +    #Maternal Well-Being  - emotional support provided  - bonding with infant    #[place] Feeding  - breastfeeding/pumping encouraged; lactation consult prn    #Dispo  - anticipate d/c on PPD #3 if meeting all postpartum milestones and Bps well controlled  - for f/u 4-6 weeks with Primary OB provider    Kelsea Souza MD  Seen and d/w dr. Gomes    Medical Problems       Problem List       * (Principal) Labor and delivery indication for care or intervention ([facility])    37 weeks gestation of pregnancy ([facility])    Overview Addendum 3/14/2024  3:03 PM by Amanda Myers MD     25 week EFW twin A 741g, 33%  and Twin B 764g, 42%.  28 week EFW twin A 1188g, 39% and twin B 1204g, 43%. Cephalic/breech.   32 week EFW twin A 1769g, 21%. Twin B 1863g, 32%. Vertex/breech          Head injury    Heartburn during pregnancy, antepartum ([facility])    Primiparous, first trimester ([facility])    Overview Addendum 2023  2:17 PM by Amanda Myers MD     Carrier screening returned negative for CF and SMA.  NIPS returned risk reducing, male/male.          Dichorionic diamniotic twin pregnancy in third trimester ([facility])    Overview Addendum 3/31/2024  9:46 AM by Amanda Myers MD     Carrier screen returned negative. NIPS returned risk reducing.  Early anatomy survey and NT returned in normal range X2.  Plan serial growth imaging and delivery at 38.0-38.6.   [facility].  32 week EFW twin A 1769g, 21%. Twin B 1863g, 32%. Vertex/breech.   36 week EFW twin A 2426g, 12% vertex, Twin B 2721g, 34% vertex.          Obesity, Class II, BMI 35-39.9    Known fetal anomaly, antepartum, fetus 2 ([facility])    Overview Addendum 1/11/2024  7:23 AM by Amanda Myers MD     Twin B- Right pyelectasis with APRD of 5.3mm   Pyelectasis resolved at follow up usn at 25 weeks gestation.         Pregnancy related hip pain in third trimester, antepartum ([facility])    Overview Signed 3/14/2024  3:06 PM by Amanda Myers MD     Hip pain in pregnancy, trouble sleeping.  Will refer to pelvic floor physical therapy.         Gastroesophageal reflux disease without esophagitis          Subjective   Doing well this AM. Sore but not in pain. No headache, visual changes, rUQ pain. No shortness of breath or chest pain.    Objective   Last Vitals:  Temp Pulse Resp BP MAP Pulse Ox   36.4 °C (97.5 °F) 87 (!) 23 107/62   93 %     Vitals Min/Max Last 24 Hours:  Temp  Min: 36.4 °C (97.5 °F)  Max: 37.3 °C (99.1 °F)  Pulse  Min: 79  Max: 120  Resp  Min: 18  Max: 23  BP  Min: 100/50  Max: 177/96    Intake/Output:    Intake/Output Summary (Last 24 hours) at 4/12/2024 0901  Last data filed at 4/12/2024 0830  Gross per 24 hour   Intake 4954.03 ml   Output 3280 ml   Net 1674.03 ml       Physical Examination:  General: no acute distress  HEENT: normocephalic, atraumatic  Heart: warm and well perfused  Lungs: breathing comfortably on room air, lungs CTAB  Abdomen: soft non tender, uterus firm at umbilicus, bandage from c/s clean and dry  Extremities: moving all extremities, 1+ LE edema bilaterally  Neuro: awake and conversant  Psych: appropriate mood and affect    Lab Review:  Labs in chart have been reviewed

## 2024-04-12 NOTE — ANESTHESIA POSTPROCEDURE EVALUATION
Patient: Kindra Hunt    Procedure Summary       Date: 04/10/24 Room / Location: [facility] OB    Anesthesia Start:  Anesthesia Stop: 24    Procedure: OBGYN Delivery  Section Diagnosis: (Failure to Progress)    Surgeons: Aydee HAILE MD Responsible Provider: Earline Paz MD MPH    Anesthesia Type: epidural ASA Status: 2            Anesthesia Type: epidural    Vitals Value Taken Time   /61 24   Temp 36.58 24   Pulse 110 24   Resp 20 24   SpO2 94 % 24       Anesthesia Post Evaluation    Patient location during evaluation: bedside  Patient participation: complete - patient participated  Level of consciousness: awake and alert  Pain score: 0  Pain management: adequate  Airway patency: patent  Cardiovascular status: acceptable  Respiratory status: acceptable  Hydration status: acceptable  Postoperative Nausea and Vomiting: none  Comments: Nasal cannula in place with report to Rns. Pt alert at this time. Epidural capped.        There were no known notable events for this encounter.

## 2024-04-12 NOTE — DISCHARGE INSTRUCTIONS

## 2024-04-12 NOTE — L&D DELIVERY NOTE
OB Delivery Note  2024  Kindra HILLIARD Gilbert  23 y.o.      Dipika Hunt [[id number]]   , Low Transverse      Olga Hunt [[id number]]   , Low Transverse        Gestational Age: 38w2d  /Para:   Quantitative Blood Loss: Admission to Discharge: 1640 mL (4/10/2024  4:45 PM - 2024  1:14 AM)    Dipika Hunt [[id number]]      Labor Events    Sac identifier: Sac 1  Rupture date/time: 2024 0038  Rupture type: Artificial  Fluid color: Clear  Labor type: Induced Onset of Labor  Labor allowed to proceed with plans for an attempted vaginal birth?: Yes  Induction: Misoprostol, Oxytocin, Simon/EASI  Induction indications: Multiple Gestation  Complications: Failure to Progress in First Stage       Labor Event Times    Labor onset date/time: 4/10/2024 1645       Placenta    Placenta delivery date/time: 2024  Placenta removal: Manual removal  Placenta appearance: Intact  Placenta disposition: pathology       Cord    Vessels: 3 vessels  Complications: None  Delayed cord clamping?: Yes  Cord clamped date/time: 2024  Cord blood disposition: Discarded  Gases sent?: No  Stem cell collection (by provider): No       Lacerations    Episiotomy: None  Perineal laceration: None  Other lacerations?: No  Repair suture: None       Anesthesia    Method: Epidural       Operative Delivery    Forceps attempted?: No  Vacuum extractor attempted?: No       Shoulder Dystocia    Shoulder dystocia present?: No       [place] Delivery    Time head delivered: 2024 21:16:00  Birth date/time: 2024 21:16:00  Delivery type: , Low Transverse   categorization: primary   priority: routine  Indications for : Failure to Progress  Incision type: low transverse  Complications: Failure to Progress in First Stage       Resuscitation    Method: Tactile stimulation       Apgars    Living status:   Apgar Component Scores:  1 min.:  5 min.:  10 min.:  15 min.:   20 min.:    Skin color:  1  1       Heart rate:  2  2       Reflex irritability:  2  2       Muscle tone:  1  2       Respiratory effort:  2  2       Total:  8  9       Apgars assigned by: JUDY GARNER       Delivery Providers    Delivering clinician: Aydee HAILE MD   Provider Role    Beth Denney, RN Delivery Nurse    Kalie Garner, RN Nursery Nurse    Mathew Jensen MD Resident               Olga Hunt [[id number]]      Labor Events    Sac identifier: Sac 2  Rupture date/time: 2024  Rupture type: Artificial  Fluid color: Clear  Fluid odor: None  Labor type: Induced Onset of Labor  Labor allowed to proceed with plans for an attempted vaginal birth?: Yes  Induction: Misoprostol, Oxytocin, Simon/EASI  Induction indications: Multiple Gestation       Labor Event Times    Labor onset date/time: 4/10/2024 164       Placenta    Placenta delivery date/time: 2024  Placenta removal: Manual removal  Placenta appearance: Intact  Placenta disposition: pathology       Cord    Vessels: 3 vessels  Complications: None  Delayed cord clamping?: Yes  Cord clamped date/time: 2024  Cord blood disposition: Discarded  Gases sent?: No  Stem cell collection (by provider): No       Lacerations    Episiotomy: None  Perineal laceration: None  Other lacerations?: No  Repair suture: None       Anesthesia    Method: Epidural       Operative Delivery    Forceps attempted?: No  Vacuum extractor attempted?: No       Shoulder Dystocia    Shoulder dystocia present?: No        Delivery    Time head delivered: 2024 21:18:00  Birth date/time: 2026 21:18:00  Delivery type: , Low Transverse       Resuscitation    Method: Tactile stimulation       Apgars    Living status:   Apgar Component Scores:  1 min.:  5 min.:  10 min.:  15 min.:  20 min.:    Skin color:  1  1       Heart rate:  2  2       Reflex irritability:  2  2       Muscle tone:  1  2       Respiratory effort:  2  2        Total:  8  9       Apgars assigned by: JUDY GARNER       Delivery Providers    Delivering clinician: Aydee HAILE MD   Provider Role    Beth Denney RN Delivery Nurse    Kalie Garner RN Nursery Nurse    Mathew Jensen MD Resident             Procedure Details  Pre op diagnosis:   1.) IUP at 38.1 wga  2.) Di/Di Twin Gestation  3.) sPEC  4.) Failure to Progress in the First Stage of Labor    Post op diagnosis: Same    Findings: Normal appearing gravid uterus, fallopian tubes, and ovaries. Amniotic fluid clear. Twin A male infant in cephalic presentation. Twin B in transverse presentation    Procedure: Patient was taken to the OR where combined spinal epidural anesthesia was administered/epidural anesthesia was redosed.  She was then placed in the dorsal supine position with a left lateral tilt. A doty catheter was placed, SCDs were applied, and a vaginal prep was performed. A pre-procedure time out was performed.  The patient was given prophylactic dose of IV antibiotics. She was then prepped and draped in the usual sterile fashion. A Pfannenstiel skin incision was made through the skin with the scalpel and then carried through the subcutaneous fat to the underlying fascial layer with sharp dissection/electrosurgery. The fascia was incised on either side of the midline with the scalpel, and fascia was then dissected off the rectus muscle bilaterally using sharp dissection with curved Tran scissors/electrosurgery with clear visualization inferiorly using Kaelyn clamps. The superior aspect of the incision was grasped, tented up with Kocher clamps and the rectus muscle was dissected off bluntly. The muscles were divided in the midline, the peritoneum was identified and then entered bluntly, and incision extended superiorly and inferiorly with good visualization of bladder below. Bladder blade was inserted. A low transverse uterine incision was made with the scalpel, the uterine cavity was entered,  and the hysterotomy was extended bilaterally with cephalocaudal stretching. The first infant was delivered atraumatically, the cord was clamped and cut, and infant was handed off to the awaiting nursing staff. A cord segment and blood sample were collected. The second infant bag of water was then broken and found to be in the transverse presentation and then rotated to cephalic and delivered atraumatically. The placenta was then expressed. The uterus was exteriorized and cleared of all clot and debris. The uterine incision was repaired using a running stitch of 0-Vicryl. A second layer of the same suture was placed in an imbricating fashion. Ongoing bleeding was was noted at the hysterotomy TXA was given. Uterine tone was assessed and found to be adequate. Good hemostasis was noted. The uterus was then placed back inside the pelvis, the gutters were cleared of all clots and debris. The hysterotomy was again evaluated and found to be hemostatic, and the underside of the fascia and bladder and the rectus muscles were also found to be hemostatic. The fascia was closed using a running stitch of 0-Vicryl. The subcutaneous layer was irrigated, small bleeding vessels were cauterized, and the subcutaneous layer was reapproximated using a running stitch of 2-0 Monocryl. The skin was closed with 4-0 Monocryl on a curved needle.  All counts were correct, the patient tolerated the procedure well. Dr. Bradley was present for all key portions of the procedure. The patient and both infants were taken back to the recovery room in stable condition.     Mathew Jensen MD PGY-3

## 2024-04-12 NOTE — CARE PLAN
The patient's goals for the shift include manage pain, healthy baby healthy mom    The clinical goals for the shift include vaginal delivery, pain control    Problem: Vaginal Birth or  Section  Goal: Minimal s/sx of HDP and BP<160/110  Outcome: Progressing     Problem: Postpartum  Goal: Minimal s/sx of HDP and BP<160/110  Outcome: Progressing  Goal: Experiences normal postpartum course  Outcome: Progressing  Goal: Appropriate maternal -  bonding  Outcome: Progressing  Goal: Establish and maintain infant feeding pattern for adequate nutrition  Outcome: Progressing  Goal: No s/sx infection  Outcome: Progressing  Goal: No s/sx of hemorrhage  Outcome: Progressing     Problem: Hypertensive Disorder of Pregnancy (HDP)  Goal: Minimal s/sx of HDP and BP<160/110  Outcome: Progressing  Goal: Adequate urine output (0.5 ml/kg/hr)  Outcome: Progressing

## 2024-04-12 NOTE — PROGRESS NOTES
Postpartum Progress Note    Assessment/Plan   Kindra Hunt is a 23 y.o., , who delivered at 38w1d gestation and is now postpartum day 1 from [facility] for failed IOL in the setting of di/di twins, now o magnesium until  for sPEC.    sPEC  - Dx by SRBP requiring IV Lab 20  - Continue: Nifed 30 mg daily, normotensive  - PEC labs neg  - Asx  - UOP adequate currently      Tachypnea  - in the setting of nasal congestion, mouth breathing  - no chest pain, SOB, or tachycardia, low suspicion for PE  - mucinex and nasal spray ordered  - encouraged IS given postop    Postpartum, postoperative care  - continue routine postpartum care  - pain well controlled on po medications  - dvt risk score   8, for ppx lovenox  - Rh: +  - feeding: breast, lactation cs prn  - ppBC: declines at this time    Dispo  - anticipate d/c on PPD #3 if meeting all postpartum milestones and Bps well controlled  - for f/u within 1st week for BP check and in 4-6 weeks with Primary OB provider    D/w Dr. Holly Leblanc MD  PGY-2, Obstetrics and Gynecology         Principal Problem:    Labor and delivery indication for care or intervention ([facility])  Active Problems:    Gastroesophageal reflux disease without esophagitis    Pregnancy Problems (from 09/15/23 to present)       Problem Noted Resolved    Labor and delivery indication for care or intervention ([facility]) 4/10/2024 by Mathew Jensen MD No    Priority:  Medium      Pregnancy related hip pain in third trimester, antepartum ([facility]) 3/14/2024 by Amanda Myers MD No    Priority:  Medium      Overview Signed 3/14/2024  3:06 PM by Amanda Myers MD     Hip pain in pregnancy, trouble sleeping.  Will refer to pelvic floor physical therapy.         Known fetal anomaly, antepartum, fetus 2 ([facility]) 2023 by Britta Duran MD No    Priority:  Medium      Overview Addendum 2024  7:23 AM by Amanda Myers MD     Twin B- Right pyelectasis with APRD of  5.3mm   Pyelectasis resolved at follow up usn at 25 weeks gestation.         Dichorionic diamniotic twin pregnancy in third trimester ([facility]) 10/9/2023 by Kapil Rausch MD No    Priority:  Medium      Overview Addendum 3/31/2024  9:46 AM by Amanda Myers MD     Carrier screen returned negative. NIPS returned risk reducing.  Early anatomy survey and NT returned in normal range X2.  Plan serial growth imaging and delivery at 38.0-38.6.   She desires [place] [facility].  32 week EFW twin A 1769g, 21%. Twin B 1863g, 32%. Vertex/breech.   36 week EFW twin A 2426g, 12% vertex, Twin B 2721g, 34% vertex.         37 weeks gestation of pregnancy ([facility]) 9/16/2023 by Aman Nicole MA No    Priority:  Medium      Overview Addendum 3/14/2024  3:03 PM by Amanda Myers MD     25 week EFW twin A 741g, 33%  and Twin B 764g, 42%.  28 week EFW twin A 1188g, 39% and twin B 1204g, 43%. Cephalic/breech.   32 week EFW twin A 1769g, 21%. Twin B 1863g, 32%. Vertex/breech                Subjective   Her pain is well controlled with current medications  She is passing flatus  She is not ambulating while on mag  She is tolerating a Adult diet Regular  She reports no breast or nursing problems  She denies emotional concerns today   Her plan for contraception is declines at this time.     Denies HA, vision changes, CP, SOB, or RUQ pain.   Intermittently tachypnic, satting well on RA. Feeling very congested since CS, breathing through mouth. Denies f/c, calf pain, chest discomfort.     Objective   Allergies:   Adhesive tape-silicones         Last Vitals:  Temp Pulse Resp BP MAP Pulse Ox   36.6 °C (97.9 °F) 87 20 111/70   94 %     Vitals Min/Max Last 24 Hours:  Temp  Min: 36.4 °C (97.5 °F)  Max: 37.3 °C (99.1 °F)  Pulse  Min: 79  Max: 120  Resp  Min: 15  Max: 25  BP  Min: 100/50  Max: 177/96    Intake/Output:     Intake/Output Summary (Last 24 hours) at 4/12/2024 1813  Last data filed at 4/12/2024 1734  Gross per 24 hour    Intake 5843.03 ml   Output 2740 ml   Net 3103.03 ml       Physical Exam:  General: Well appearing, alert  HEENT: normocephalic, EOMI, clear sclera  Cardio: Warm and well perfused, RRR  Resp: breathing comfortably on room air, tachyphemic, CTAB  Abd: incision c/d/I covered in dressing, appropriately tender  Neuro: grossly intact, no focal deficits, DTRs 2+  Extremities: full ROM, no calf tenderness  Psych: A&O x3, appropriate mood and affect      Lab Data:  Results from last 7 days   Lab Units 04/12/24  0449 04/10/24  1758   WBC AUTO x10*3/uL 21.1* 8.8   HEMOGLOBIN g/dL 11.1* 13.0   HEMATOCRIT % 32.5* 39.7   PLATELETS AUTO x10*3/uL 258 253   AST U/L  --  13   ALT U/L  --  5*   CREATININE mg/dL  --  0.44*

## 2024-04-12 NOTE — LACTATION NOTE
Lactation Consultant Note  Lactation Consultation  Reason for Consult: Initial assessment, Multiple gestation  Consultant Name: Karina Garg    Maternal Information  Has mother  before?: No  Infant to breast within first 2 hours of birth?: Yes  Exclusive Pump and Bottle Feed: No    Maternal Assessment  Breast Assessment: Large, Symmetrical, Soft  Nipple Assessment: Intact, Erect  Areola Assessment: Normal    Infant Assessment  Infant Behavior: Sleepy  Infant Assessment: Good cupping of tongue    Feeding Assessment  Nutrition Source: Breastmilk, Formula (per mother’s request)  Feeding Method: Nursing at the breast, Paced bottle  Feeding Position: Football/seated, Skin to skin  Suck/Feeding: Sustained  Latch Assessment: Moderate assistance is needed, Instructed on deep latch, Comfortable latch, Comfortable with no pain, Sucks with long jaw movement    LATCH TOOL  Latch: Grasps breast, tongue down, lips flanged, rhythmic sucking  Audible Swallowing: Spontaneous and intermittent (24 hours old)  Type of Nipple: Everted (After stimulation)  Comfort (Breast/Nipple): Soft/non-tender  Hold (Positioning): Minimal assist, teach one side, mother does other, staff holds  LATCH Score: 9    Patient Follow-up  Inpatient Lactation Follow-up Needed : Yes  Outpatient Lactation Follow-up: Recommended    Other OB Lactation Documentation  Maternal Risk Factors: Preeclampsia, Hypertension  Infant Risk Factors: Early term birth 37-39 weeks    Recommendations/Summary  Mom is both breast and formula feeding her twins, per her preference. Mom states that her twins, who are not even 24 hours old yet,  well overnight for at least 10-15 mins per feed. They both are latching well and mom is not feeling any pain or discomfort with the feed. Mom had already fed twin one both at the breast and with a bottle of formula just before I entered the room. She had also tried to latch twin two, but he was very sleepy. She asked for my  assistance with latching twin two to the breast. Discussed normal  feeding behavior during the first and second 24 hours of life. I unwrapped baby and placed him skin to skin with mom in football hold. Baby was sleepy initially but opened wide to latch after a couple of attempts and latched with flanged lips, areolar attachment, nose and chin touching the breast, with rhythmic sucks and a couple of swallows noted. Mom is not expressible yet, but she did pump 12 mls of colostrum at home towards the end of her pregnancy. Discussed benefits of skin to skin for mom and babies and for mom's milk production and supply. Reviewed  feeding frequency and duration. Discussed differences between colostrum and expressed milk and that full milk supply typically comes in 3-5 days after delivery.    Feeding plan at this point is to continue to attempt to latch each twin individually at the breast and then to supplement each of them with 10-20 mls of formula after feeds if desired. I discussed pumping with mom, but we decided that she will not pump at this time because her twins are full term (38.1 weeks), they are both latching well and consistently, and they are still under 24 hours of life.     Mom needs a pump for home so I will order a [employer] via [employer]. Reviewed the outpatient lactation information.

## 2024-04-12 NOTE — CARE PLAN
Patient is progressing through goals. Lochia is light, pain is well controlled, and vital signs are stable.       Problem: Postpartum  Goal: Experiences normal postpartum course  Outcome: Progressing     Problem: Postpartum  Goal: Appropriate maternal -  bonding  Outcome: Progressing     Problem: Postpartum  Goal: Establish and maintain infant feeding pattern for adequate nutrition  Outcome: Progressing

## 2024-04-12 NOTE — ANESTHESIA POSTPROCEDURE EVALUATION
Patient: Kindra Hunt    Procedure Summary       Date: 04/10/24 Room / Location: [facility]    Anesthesia Start:  Anesthesia Stop: 24    Procedure: OBGYN Delivery  Section Diagnosis: (Failure to Progress)    Surgeons: Aydee HAILE MD Responsible Provider: Earline Paz MD MPH    Anesthesia Type: epidural ASA Status: 2            Anesthesia Type: epidural    Vitals Value Taken Time   /77 24 0630   Temp 36.7 °C (98.1 °F) 24 0429   Pulse 91 24 0630   Resp 21 24 0630   SpO2 96 % 24 0630       Anesthesia Post Evaluation    Patient location during evaluation: floor  Patient participation: complete - patient participated  Level of consciousness: awake and alert  Pain score: 0  Pain management: adequate  Airway patency: patent  Cardiovascular status: acceptable  Respiratory status: acceptable  Hydration status: acceptable  Postoperative Nausea and Vomiting: none    Kindra Hunt is a 23 y.o., , who had a      Gilbert, aONENoraBOY [[id number]]   , Low Transverse      Gilbert, bTWONoraBOY [[id number]]   , Low Transverse  delivery on      Gilbert, aONENoraBOY [[id number]]   2024      Gilbert, bTWONoraBOY [[id number]]   2024  at 38w1d and is now POD1.    She had Neuraxial Anesthesia without immediate complications noted.       Pain well controlled    Vitals:    24 0630   BP: 125/77   Pulse: 91   Resp: 21   Temp:    SpO2: 96%       Neuraxial site assessed. No visible redness or swelling or drainage. Patient  is on magnesium so still have doty catheter and has not gotten up to move around. Moves all extremities with equal strength bilaterally. Pt instructed to let us know if they are not able to ambulate or has trouble urinating. No complaints of nausea/vomiting. Tolerating PO intake well. No s/sx of PDPH.     Anesthesia will sign off     ROSA ELENA Escalante-KAREN       There were no known notable events for this  encounter.

## 2024-04-12 NOTE — SIGNIFICANT EVENT
Decision for  Section    To bedside to evaluate patient, feeling more uncomfortable with pitocin at 26u, epidural and magnesium infusing. SVE unchanged at /-1, now unchanged over several exams and now 18 hours s/p AROM and on pitocin. Given these findings, the recommendation was made to proceed with primary  section for arrest of labor in the setting of di/di twins. Risks, benefits, and alternatives to  section discussed with patient and patient would like to proceed    FHT: Twin A, baseline 140/mod/+accel/-decel   FHT: Twin B, Baseline 150/mod/+accel/-decel     SVE: /-1    sPEC  -Newly Dx by severe range BP requiring IV tx  -IV labetalol 20mg IV  - Mg gtt infusing, plan to continue 24 hours after delivery  -BP regimen: Nifedipine 30mg  -HELLP labs neg x 1, repeat set after delivery.    Pitocin turned off. Charge RN and Anesthesia aware.     D/w Dr. Kirk Jensen MD PGY-3

## 2024-04-12 NOTE — SIGNIFICANT EVENT
BP Cuff and Home Monitoring  Patient meets criteria for home monitoring of blood pressure post discharge.  Reason: current preeclampsia with SF. Met with patient to assess for availability of home BP monitor.  Patient stated she owns home BP monitor. . Patient educated on importance of continuing to monitor BP at home, recording BP on home monitoring log and s/sx of when to call her provider.  Pt verbalized understanding the above information.

## 2024-04-13 LAB
BLOOD EXPIRATION DATE: NORMAL
DISPENSE STATUS: NORMAL
ERYTHROCYTE [DISTWIDTH] IN BLOOD BY AUTOMATED COUNT: 14 % (ref 11.5–14.5)
HCT VFR BLD AUTO: 26.3 % (ref 36–46)
HGB BLD-MCNC: 8.5 G/DL (ref 12–16)
MCH RBC QN AUTO: 32.2 PG (ref 26–34)
MCHC RBC AUTO-ENTMCNC: 32.3 G/DL (ref 32–36)
MCV RBC AUTO: 100 FL (ref 80–100)
NRBC BLD-RTO: 0 /100 WBCS (ref 0–0)
PLATELET # BLD AUTO: 243 X10*3/UL (ref 150–450)
PRODUCT BLOOD TYPE: 6200
PRODUCT CODE: NORMAL
RBC # BLD AUTO: 2.64 X10*6/UL (ref 4–5.2)
UNIT ABO: NORMAL
UNIT NUMBER: NORMAL
UNIT RH: NORMAL
UNIT VOLUME: 350
WBC # BLD AUTO: 15.4 X10*3/UL (ref 4.4–11.3)
XM INTEP: NORMAL

## 2024-04-13 PROCEDURE — 85027 COMPLETE CBC AUTOMATED: CPT | Performed by: NURSE PRACTITIONER

## 2024-04-13 PROCEDURE — 2500000004 HC RX 250 GENERAL PHARMACY W/ HCPCS (ALT 636 FOR OP/ED): Performed by: STUDENT IN AN ORGANIZED HEALTH CARE EDUCATION/TRAINING PROGRAM

## 2024-04-13 PROCEDURE — 2500000002 HC RX 250 W HCPCS SELF ADMINISTERED DRUGS (ALT 637 FOR MEDICARE OP, ALT 636 FOR OP/ED): Performed by: NURSE PRACTITIONER

## 2024-04-13 PROCEDURE — 1100000001 HC PRIVATE ROOM DAILY

## 2024-04-13 PROCEDURE — 2500000001 HC RX 250 WO HCPCS SELF ADMINISTERED DRUGS (ALT 637 FOR MEDICARE OP): Performed by: STUDENT IN AN ORGANIZED HEALTH CARE EDUCATION/TRAINING PROGRAM

## 2024-04-13 PROCEDURE — 36415 COLL VENOUS BLD VENIPUNCTURE: CPT | Performed by: NURSE PRACTITIONER

## 2024-04-13 RX ADMIN — ACETAMINOPHEN 975 MG: 325 TABLET ORAL at 04:42

## 2024-04-13 RX ADMIN — OXYCODONE HYDROCHLORIDE 5 MG: 5 TABLET ORAL at 05:47

## 2024-04-13 RX ADMIN — POLYETHYLENE GLYCOL 3350 17 G: 17 POWDER, FOR SOLUTION ORAL at 16:56

## 2024-04-13 RX ADMIN — ACETAMINOPHEN 975 MG: 325 TABLET ORAL at 16:50

## 2024-04-13 RX ADMIN — NIFEDIPINE 30 MG: 30 TABLET, FILM COATED, EXTENDED RELEASE ORAL at 19:50

## 2024-04-13 RX ADMIN — ENOXAPARIN SODIUM 60 MG: 100 INJECTION SUBCUTANEOUS at 23:05

## 2024-04-13 RX ADMIN — IBUPROFEN 600 MG: 600 TABLET, FILM COATED ORAL at 23:06

## 2024-04-13 RX ADMIN — ACETAMINOPHEN 975 MG: 325 TABLET ORAL at 10:35

## 2024-04-13 RX ADMIN — OXYCODONE HYDROCHLORIDE 5 MG: 5 TABLET ORAL at 16:55

## 2024-04-13 RX ADMIN — IBUPROFEN 600 MG: 600 TABLET, FILM COATED ORAL at 10:35

## 2024-04-13 RX ADMIN — OXYCODONE HYDROCHLORIDE 10 MG: 5 TABLET ORAL at 00:36

## 2024-04-13 RX ADMIN — IBUPROFEN 600 MG: 600 TABLET, FILM COATED ORAL at 04:43

## 2024-04-13 RX ADMIN — ACETAMINOPHEN 975 MG: 325 TABLET ORAL at 23:06

## 2024-04-13 RX ADMIN — OXYCODONE HYDROCHLORIDE 5 MG: 5 TABLET ORAL at 10:35

## 2024-04-13 RX ADMIN — IBUPROFEN 600 MG: 600 TABLET, FILM COATED ORAL at 16:50

## 2024-04-13 ASSESSMENT — PAIN SCALES - GENERAL
PAINLEVEL_OUTOF10: 3
PAINLEVEL_OUTOF10: 4
PAINLEVEL_OUTOF10: 7
PAINLEVEL_OUTOF10: 9
PAINLEVEL_OUTOF10: 6
PAINLEVEL_OUTOF10: 7

## 2024-04-13 ASSESSMENT — PAIN - FUNCTIONAL ASSESSMENT
PAIN_FUNCTIONAL_ASSESSMENT: 0-10

## 2024-04-13 ASSESSMENT — PAIN DESCRIPTION - LOCATION
LOCATION: ABDOMEN
LOCATION: ABDOMEN
LOCATION: INCISION

## 2024-04-13 NOTE — PROGRESS NOTES
Postpartum Progress Note    Assessment/Plan   Kindra Hunt is a 23 y.o., , who delivered at 38w1d gestation and is now postpartum day 2.    s/p LTCS on POD2  - continue routine postoperative care  - pain well controlled, transition to PO meds   - Hgb, EBL 1640, acute blood loss anemia, asymptomatic, PO iron supplement on DC   Results from last 7 days   Lab Units 24  0540 24  0449 04/10/24  1758   HEMOGLOBIN g/dL 8.5* 11.1* 13.0     - DVT Score: 8, for ppx lovenox    sPEC  - Dx by SRBP requiring IV Lab 20 ()  - s/p mg   - normotensive  - asymptomatic   - HELLP labs neg  - Continue: Nifed 30 mg daily  - BP cuff for home monitoring      Tachypnea, resolved   - in the setting of nasal congestion, mouth breathing  - no chest pain, SOB, or tachycardia, low suspicion for PE  - WBC 21.1 --> 15.4 ()  - continue to monitor     Maternal Well-Being  - emotional support provided  - bonding with infant    [place] Feeding  - breastfeeding/pumping encouraged; lactation consult prn     Contraception  - Declines     Dispo  - Anticipate DC PPD3 pending BP control.  - The signs and symptoms of PEC were reviewed with the patient, including unrelenting headache, vision changes/blurred vision, and pain underneath the right breast.   - BP cuff for home for checking BP BID. Pt instructed to call primary OB if SBP > 160 or DBP > 110.  - On discharge, follow up with primary OB in 2-5 days for BP check, 2 weeks for incision check, and 4-6 weeks for postpartum visit.      Principal Problem:    Labor and delivery indication for care or intervention ([facility])  Active Problems:    Gastroesophageal reflux disease without esophagitis    Pregnancy Problems (from 09/15/23 to present)       Problem Noted Resolved    Labor and delivery indication for care or intervention ([facility]) 4/10/2024 by Mathew Jensen MD No    Priority:  Medium      Pregnancy related hip pain in third trimester, antepartum ([facility]) 3/14/2024 by  Amanda Myers MD No    Priority:  Medium      Overview Signed 3/14/2024  3:06 PM by Amanda Myers MD     Hip pain in pregnancy, trouble sleeping.  Will refer to pelvic floor physical therapy.         Known fetal anomaly, antepartum, fetus 2 ([facility]) 12/14/2023 by Britta Duran MD No    Priority:  Medium      Overview Addendum 1/11/2024  7:23 AM by Amadna Myers MD     Twin B- Right pyelectasis with APRD of 5.3mm   Pyelectasis resolved at follow up usn at 25 weeks gestation.         Dichorionic diamniotic twin pregnancy in third trimester ([facility]) 10/9/2023 by Kapil Rausch MD No    Priority:  Medium      Overview Addendum 3/31/2024  9:46 AM by Amanda Myers MD     Carrier screen returned negative. NIPS returned risk reducing.  Early anatomy survey and NT returned in normal range X2.  Plan serial growth imaging and delivery at 38.0-38.6.   She desires delivery at [facility].  32 week EFW twin A 1769g, 21%. Twin B 1863g, 32%. Vertex/breech.   36 week EFW twin A 2426g, 12% vertex, Twin B 2721g, 34% vertex.         37 weeks gestation of pregnancy ([facility]) 9/16/2023 by Aman Nicole MA No    Priority:  Medium      Overview Addendum 3/14/2024  3:03 PM by Amanda Myers MD     25 week EFW twin A 741g, 33%  and Twin B 764g, 42%.  28 week EFW twin A 1188g, 39% and twin B 1204g, 43%. Cephalic/breech.   32 week EFW twin A 1769g, 21%. Twin B 1863g, 32%. Vertex/breech                Hospital course: postpartum preeclampsia/eclampsia  The patient's blood type is A POS. Rhogam is not indicated.        Subjective   Her pain is well controlled with current medications  She is passing flatus  She is ambulating well  She is tolerating a Adult diet Regular  She reports no breast or nursing problems  She denies emotional concerns today   Her plan for contraception is none     Patient states she feels well overall with controlled pain and light lochia. She denies dizziness, lightheadedness, LOC, SOB,  uncontrolled bleeding, HA, RUQ pain, vision changes.     Objective   Allergies:   Adhesive tape-silicones         Last Vitals:  Temp Pulse Resp BP MAP Pulse Ox   36.4 °C (97.5 °F) 87 17 105/67   95 %     Vitals Min/Max Last 24 Hours:  Temp  Min: 36.2 °C (97.2 °F)  Max: 36.8 °C (98.2 °F)  Pulse  Min: 78  Max: 93  Resp  Min: 16  Max: 24  BP  Min: 105/67  Max: 126/74    Intake/Output:     Intake/Output Summary (Last 24 hours) at 4/13/2024 1337  Last data filed at 4/13/2024 0245  Gross per 24 hour   Intake 920.7 ml   Output 1450 ml   Net -529.3 ml       Physical Exam:  General: well appearing, well nourished, postpartum  Obstetric: fundus firm below umbilicus, lochia light  Skin: Warm, dry; no rashes/lesions/erythema; Pfannenstiel incision: clean, dry, well approximated, open to air, negative discharge/warmth/erythema   Breast: No masses, nipple discharge  Neuro: A/Ox3, conversational, no gross motor deficit   GI: no distension, appropriately tender, soft  Respiratory: Even and unlabored on RA  Cardiovascular: Trace BLE edema; No erythema, warmth or tenderness  Psych: appropriate mood and affect     Lab Data:  Labs in chart were reviewed.

## 2024-04-13 NOTE — CARE PLAN
Patient is progressing through goals. Stable vital signs, pain well controlled, and lochia is light.       Problem: Postpartum  Goal: Experiences normal postpartum course  Outcome: Progressing     Problem: Postpartum  Goal: Appropriate maternal -  bonding  Outcome: Progressing     Problem: Postpartum  Goal: Establish and maintain infant feeding pattern for adequate nutrition  Outcome: Progressing     Problem: Postpartum  Goal: Incisions, wounds, or drain sites healing without S/S of infection  Outcome: Progressing

## 2024-04-13 NOTE — LACTATION NOTE
Lactation Consultant Note  Lactation Consultation  Reason for Consult: Follow-up assessment, Multiple gestation  Consultant Name: Tete Anderson RN IBCLC    Maternal Information  Has mother  before?: No    Maternal Assessment  Breast Assessment: Large, Symmetrical, Soft, Compressible  Nipple Assessment: Intact, Erect  Areola Assessment: Normal    Infant Assessment  Infant Behavior: Light sleep, Feeding cues observed, Readiness to feed, Rooting response  Infant Assessment: Good cupping of tongue, Tongue protrudes over alveolar ridge    Feeding Assessment  Nutrition Source: Breastmilk, Formula (per mother’s request)  Feeding Method: Nursing at the breast  Feeding Position: Cross - cradle, Mother needs assistance with latch/positioning, Skin to skin, Cradle (lathced using a cross cradle hold then transitioned to a cradle hold once latch established)  Suck/Feeding: Sustained, Coordinated suck/swallow/breathe, Swallowing intermittently only with encouragement  Latch Assessment: Moderate assistance is needed, Instructed on deep latch, Eagerly grasped on to latch, Deep latch obtained, Optimal angle of mouth opening, Comfortable with no pain, Sucking and swallowing, Sucks with long jaw movement, Bursts of sucking, swallowing, and rest, Flanged lips, Chin moves in rhythmic motion, Comfortable latch    LATCH TOOL  Latch: Grasps breast, tongue down, lips flanged, rhythmic sucking  Audible Swallowing: Spontaneous and intermittent (24 hours old)  Type of Nipple: Everted (After stimulation)  Comfort (Breast/Nipple): Soft/non-tender  Hold (Positioning): Full assist, staff holds infant at breast  LATCH Score: 8    Breast Pump       Other OB Lactation Tools       Patient Follow-up  Inpatient Lactation Follow-up Needed : Yes    Other OB Lactation Documentation  Maternal Risk Factors: Preeclampsia, Hypertension,  delivery, Obesity (pre-pregnancy BMI >30)  Infant Risk Factors: Early term birth 37-39  weeks    Recommendations/Summary  Twin A- Assisted mother with feeding infant at her breast. Mother shared that infant had been fed bottles overnight due to her exhaustion. She reported that infant had nursed well on day one but did not feed as well yesterday and formula was provided as supplement. Mother had not pumped at this time. Educated mother on the importance of regular and consistent breast stimulation to establish a full milk supply. Discussed how latching infant directly to the breast is superior to pumping with regards to the breast stimulation and milk removal. Infant was sucking vigorously on his pacifier. Offered to assist with latching infant to her breast. Mother agreeable. Positioned infant to latch onto her left breast using a cross cradle hold. Infant latched eagerly and held a sustained latch onto others breast. Latch appeared proper and deep. Reviewed with mother the importance of latching infant deeply and properly for her comfort and effective milk transfer. Instructed mother on how to properly hold and support both her breast and infant while nursing. Used a cool cloth at times to keep saleem actively feeding. Frequent swallows were observed. Mother denied any discomfort. Discussed with parents feeding infants often 8-12 times a day. Instructed to swap sides each infant nurses from with feedings. Reviewed that if infants latch and feed well that pumping at this time would not be indicated. Discussed if supplementation continues and/or infants do not latch well and effectively that she should ask to be instructed on how to use the [employer] breast pump. The lactation consultant yesterday had ordered a breast pump for mother for home. Encouraged mother to call for feeding assistance with twins.

## 2024-04-14 VITALS
SYSTOLIC BLOOD PRESSURE: 114 MMHG | HEART RATE: 90 BPM | OXYGEN SATURATION: 96 % | RESPIRATION RATE: 16 BRPM | HEIGHT: 58 IN | WEIGHT: 210 LBS | TEMPERATURE: 98.4 F | BODY MASS INDEX: 44.08 KG/M2 | DIASTOLIC BLOOD PRESSURE: 77 MMHG

## 2024-04-14 PROCEDURE — 2500000001 HC RX 250 WO HCPCS SELF ADMINISTERED DRUGS (ALT 637 FOR MEDICARE OP): Performed by: STUDENT IN AN ORGANIZED HEALTH CARE EDUCATION/TRAINING PROGRAM

## 2024-04-14 RX ORDER — OXYCODONE HYDROCHLORIDE 5 MG/1
5 TABLET ORAL EVERY 6 HOURS PRN
Qty: 12 TABLET | Refills: 0 | Status: SHIPPED | OUTPATIENT
Start: 2024-04-14 | End: 2024-04-17

## 2024-04-14 RX ORDER — NIFEDIPINE 30 MG/1
30 TABLET, FILM COATED, EXTENDED RELEASE ORAL EVERY 24 HOURS
Qty: 30 TABLET | Refills: 1 | Status: SHIPPED | OUTPATIENT
Start: 2024-04-14 | End: 2024-06-13

## 2024-04-14 RX ORDER — ACETAMINOPHEN 325 MG/1
975 TABLET ORAL EVERY 6 HOURS
Qty: 120 TABLET | Refills: 0 | Status: SHIPPED | OUTPATIENT
Start: 2024-04-14

## 2024-04-14 RX ORDER — IBUPROFEN 600 MG/1
600 TABLET ORAL EVERY 6 HOURS
Qty: 60 TABLET | Refills: 0 | Status: SHIPPED | OUTPATIENT
Start: 2024-04-14

## 2024-04-14 RX ORDER — FERROUS SULFATE 325(65) MG
65 TABLET ORAL
Qty: 30 TABLET | Refills: 1 | Status: SHIPPED | OUTPATIENT
Start: 2024-04-14 | End: 2024-06-13

## 2024-04-14 RX ADMIN — IBUPROFEN 600 MG: 600 TABLET, FILM COATED ORAL at 05:15

## 2024-04-14 RX ADMIN — ACETAMINOPHEN 975 MG: 325 TABLET ORAL at 05:15

## 2024-04-14 RX ADMIN — ACETAMINOPHEN 975 MG: 325 TABLET ORAL at 11:09

## 2024-04-14 RX ADMIN — OXYCODONE HYDROCHLORIDE 10 MG: 5 TABLET ORAL at 05:16

## 2024-04-14 RX ADMIN — IBUPROFEN 600 MG: 600 TABLET, FILM COATED ORAL at 11:09

## 2024-04-14 RX ADMIN — OXYCODONE HYDROCHLORIDE 5 MG: 5 TABLET ORAL at 11:09

## 2024-04-14 ASSESSMENT — PAIN DESCRIPTION - DESCRIPTORS
DESCRIPTORS: SORE
DESCRIPTORS: SORE

## 2024-04-14 ASSESSMENT — PAIN - FUNCTIONAL ASSESSMENT
PAIN_FUNCTIONAL_ASSESSMENT: 0-10
PAIN_FUNCTIONAL_ASSESSMENT: 0-10

## 2024-04-14 ASSESSMENT — PAIN SCALES - GENERAL
PAINLEVEL_OUTOF10: 9
PAINLEVEL_OUTOF10: 9
PAINLEVEL_OUTOF10: 7
PAINLEVEL_OUTOF10: 6

## 2024-04-14 NOTE — CARE PLAN
The patient's goals for the shift include   Problem: Postpartum  Goal: Experiences normal postpartum course  Outcome: Met     Problem: Safety - Adult  Goal: Free from fall injury  Outcome: Met         VSS, bleeding and swelling minimal, pain controlled with medications, IV removed, incision infection free, breastfeeding/formula feeding/pumping.

## 2024-04-14 NOTE — LACTATION NOTE
Lactation Consultant Note  Lactation Consultation  Reason for Consult: Follow-up assessment, Multiple gestation  Consultant Name: Kailey Moran RN, IBCLC    Maternal Information  Has mother  before?: No  Infant to breast within first 2 hours of birth?: Yes    Maternal Assessment  Breast Assessment: Medium, Symmetrical, Compressible, Soft, Warm (mom reported increased heaviness to breasts)  Nipple Assessment: Intact, Erect  Areola Assessment: Normal    Infant Assessment  Infant Behavior: Awake, Active alert  Infant Assessment: Tongue protrudes over alveolar ridge, Good lateral movement of tongue, Good cupping of tongue, Palate - high/arch/bubble/normal    Feeding Assessment  Nutrition Source: Breastmilk, Formula (per mother’s request) (frequent formula bottles overnight per mother's request)  Feeding Method: Nursing at the breast  Feeding Position: Football/seated, Mother needs assistance with latch/positioning, Chin tucked into breast, Nose lightly touching breast, Skin to skin  Suck/Feeding: Sustained, Audible swallowing, Content after feeding, Coordinated suck/swallow/breathe  Latch Assessment: Minimal assistance is needed, Eagerly grasped on to latch, Areolar attachment, Deep latch obtained, Latch achieved, Flanged lips, Comfortable latch, Sucks with long jaw movement, Bursts of sucking, swallowing, and rest (assisted with deeper latch for both infants)    LATCH TOOL  Latch: Grasps breast, tongue down, lips flanged, rhythmic sucking  Audible Swallowing: Spontaneous and intermittent (24 hours old)  Type of Nipple: Everted (After stimulation)  Comfort (Breast/Nipple): Soft/non-tender  Hold (Positioning): Minimal assist, teach one side, mother does other, staff holds  LATCH Score: 9    Breast Pump       Other OB Lactation Tools       Patient Follow-up  Outpatient Lactation Follow-up: Recommended    Other OB Lactation Documentation  Maternal Risk Factors:  delivery, Preeclampsia, Hypertension,  Obesity (pre-pregnancy BMI >30)  Infant Risk Factors: Early term birth 37-39 weeks    Recommendations/Summary  Twin B brought to room from nursery, fed formula bottles overnight, per mother's request. Infant still fussy upon arrival to room, exhibiting hunger cues, so we recommended to latch. Mom latched infant via football hold. Assisted mom with more pillow support and a deeper asymmetrical latch. Showed mom how to unlatch infant by breaking suction with finger to protect nipple from damage. Encouraged mom to provide increased support of her breast with feedings via mother's hand and possible cloth diaper if necessary for better support and breast elevation. Infant had a brief sustained latch with bursts of intermittent swalllows as he recently fed prior with 20ml of formula. Provided infant with stimulation to encourage him to continue feeding. He remained sleepy and sluggish despite stimulation provided. Infant released from the breast and appeared satiated. Reviewed with mom signs of infant fullness.    Twin A was eager to latch. Mom latched infant via football hold to right breast with minimal assistance via verbal guidance to obtain a deeper and more effective latch. Infant sustained a good rhythmic sucking pattern with audible swallows. Mother denied any discomfort. We discussed feeding infants at breast frequently 8-12 times a day for proper drainage of breast and to sustain her already established milk supply. Educated mom that if she continues to supplement frequently, that she needs to pump in aylin of supplementation to adequately establish and maintain a full milk supply.     Encouraged mom to call for help if needed. Discussed availability of outpatient lactation resources. Mom has pump ordered and has a wearable pump at home in the mean time.

## 2024-04-14 NOTE — DISCHARGE SUMMARY
Postpartum Discharge Summary    Admission Date: 4/10/2024  Discharge Date: 24     Discharge Diagnosis  Problem List Items Addressed This Visit       Dichorionic diamniotic twin pregnancy in third trimester ([facility])    Overview     Carrier screen returned negative. NIPS returned risk reducing.  Early anatomy survey and NT returned in normal range X2.  Plan serial growth imaging and delivery at 38.0-38.6.   She desires delivery at [facility].  32 week EFW twin A 1769g, 21%. Twin B 1863g, 32%. Vertex/breech.   36 week EFW twin A 2426g, 12% vertex, Twin B 2721g, 34% vertex.         Relevant Orders    Labor Induction (Completed)     Other Visit Diagnoses        delivery delivered ([facility])    -  Primary    Relevant Medications    acetaminophen (Tylenol) 325 mg tablet    ibuprofen 600 mg tablet    oxyCODONE (Roxicodone) 5 mg immediate release tablet    ferrous sulfate, 325 mg ferrous sulfate, tablet    Severe pre-eclampsia in third trimester ([facility])        Relevant Medications    NIFEdipine ER (Adalat CC) 30 mg 24 hr tablet             Kindra Hunt is a 23 y.o. female now  and postpartum day 3      Pregnancy notable for:  - Di/Di Twins. Last Growth US 3/29 Twin A, EFW 2426g (12%), AC 51%; Twin B EFW 2721 (34%), AC 88%    Hospital Course  Admitted for IOL in s/o twin pregnancy, PLTCS for failed IOL  Delivery Date:      Piedad HuntBOSENAIT [[id number]]   2024      Annamarie HuntWONoraBOY [[id number]]   2024       Ksenia HuntNoraBOY [[id number]]   9:16 PM      Gilbert bTWONoraBOY [[id number]]   9:18 PM   Delivery type:      GilbertKsenia stewartNoraBOY [[id number]]   , Low Transverse      Gilbert bTWONoraBOY [[id number]]   , Low Transverse    GA at delivery: 38w1d  Outcome:      Ksenia HuntNoraBOY [[id number]]   Living      Gilbert, bTWONoraBOY [[id number]]   Living   Anesthesia during delivery:      Ksenia HuntNoraBOSENAIT [[id number]]   Epidural      Gilbert, bTWONoraBOY [[id number]]   Epidural   Intrapartum  complications:      Ksenia HuntNhiRojas [[id number]]   Failure to Progress in First Stage      Olga Hunt [[id number]]      Feeding method: Breastfeeding Status: Yes     Delivery complications: none  Contraception at discharge: declines    Complications Requiring Follow-Up  SPEC  - diagnosed by severe range BP requiring acute treatment   - s/p Mg gtt  - HELLP labs negative   - normotensive on Nifed 30 daily  - asymptomatic  - BP cuff for home    Acute blood loss anemia  - hgb 13.0 -> EBL 1500 mL -> 11.1 -> 8.5  - PO Fe at DC    Pertinent Subjective and Physical Exam At Time of Discharge  Patient seen at bedside - doing well and no acute events overnight. Pain well-controlled on current regimen - requiring oxycodone for pain control (PDMP OARRS reviewed), lochia light, voiding spontaneously, passing flatus, ambulating independently, and tolerating PO.     General: well appearing, well-nourished, postpartum  Obstetric: abdomen soft/non-tender, fundus firm below umbilicus, lochia light, Pfannenstiel incision c/d/i  Skin: No rashes/lesions/erythema  Neuro: A/Ox3, conversational  GI: +BS, +flatus  Respiratory: Even and unlabored on RA, LSCTA BL  Cardiovascular: RRR, normal S1, S2  Extremities: No edema, discoloration, or pain in BLE, equal and palpable DP and PT pulses    Psych: appropriate mood and affect     Discharge Meds     Your medication list        START taking these medications        Instructions Last Dose Given Next Dose Due   acetaminophen 325 mg tablet  Commonly known as: Tylenol      Take 3 tablets (975 mg) by mouth every 6 hours.       ferrous sulfate (325 mg ferrous sulfate) tablet      Take 1 tablet by mouth once daily with breakfast.       ibuprofen 600 mg tablet      Take 1 tablet (600 mg) by mouth every 6 hours.       NIFEdipine ER 30 mg 24 hr tablet  Commonly known as: Adalat CC      Take 1 tablet (30 mg) by mouth once every 24 hours. Do not crush, chew, or split.       oxyCODONE 5 mg immediate  release tablet  Commonly known as: Roxicodone      Take 1 tablet (5 mg) by mouth every 6 hours if needed (Pain score 6-8) for up to 3 days.              CONTINUE taking these medications        Instructions Last Dose Given Next Dose Due   famotidine 20 mg tablet  Commonly known as: Pepcid      TAKE 1 TABLET BY MOUTH TWICE A DAY       famotidine 20 mg tablet  Commonly known as: Pepcid      TAKE 1 TABLET BY MOUTH TWICE A DAY       famotidine 20 mg tablet  Commonly known as: Pepcid      Take 1 tablet (20 mg) by mouth 2 times a day.       ondansetron ODT 4 mg disintegrating tablet  Commonly known as: Zofran-ODT           PRENATAL ORAL                  STOP taking these medications      aspirin 81 mg EC tablet                  Where to Get Your Medications        These medications were sent to [facility]/pharmacy #[id number] - [place] [address]      Phone: [phone]   acetaminophen 325 mg tablet  ferrous sulfate (325 mg ferrous sulfate) tablet  ibuprofen 600 mg tablet  NIFEdipine ER 30 mg 24 hr tablet  oxyCODONE 5 mg immediate release tablet          Test Results Pending At Discharge  Pending Labs       Order Current Status    Surgical Pathology Exam - [facility] In process            Outpatient Follow-Up  Future Appointments   Date Time Provider Department Center   4/17/2024  2:30 PM Amanda Myers MD [id number]     Patient instructed to call to schedule  4-6 weeks for routine postpartum visit  with Dr. Myers. Already scheduled for BP check this week (see above).    Kaitlynn Orosco PA-C  04/14/24 11:44 AM  [facility]

## 2024-04-15 NOTE — ASSESSMENT & PLAN NOTE
Patient is doing well postpartum. She will continue to monitor BP and will wean off medication. S/S of preeclampsia were reviewed.   She was advised to slowly increase activity. Pelvic rest is recommended until 6 weeks postpartum. She was advised to continue prenatal vitamins and to assure adequate hydration.   Contraception was discussed.   She was advised to call the office with any concerning symptom, worsening of pain or bleeding, concern for postpartum depression or anxiety.   Follow up is planned in 1 week.

## 2024-04-15 NOTE — PROGRESS NOTES
Postpartum Progress Note    Assessment/Plan   Kindra Hunt is a 23 y.o.,  presents for postpartum visit. She had a  delivery of twins at 38 weeks on 2024 due to arrest of dilation. Delivery was complicated by preeclampsia with severe range pressures requiring magnesium sulfate and Nifedipine 30 mg at discharge.   Home BP is highest at 134/93 with most levels in normal range.       Problem List       No episode was linked to this visit.          Hospital course: gestational hypertension       Subjective         She reports no breast or nursing problems  She denies emotional concerns today   Her plan for contraception is vasectomy     She has edema of feet. She is encouraged to elevate feet and assure adequate hydration.      Allergies:   Adhesive tape-silicones         Physical Exam:  Physical Exam  Constitutional:       Appearance: Normal appearance.   HENT:      Head: Normocephalic and atraumatic.   Pulmonary:      Effort: Pulmonary effort is normal.   Abdominal:      Palpations: Abdomen is soft.      Comments: Incision is healing well.   Neurological:      General: No focal deficit present.      Mental Status: She is alert and oriented to person, place, and time.   Skin:     General: Skin is warm and dry.      Findings: No rash.   Psychiatric:         Mood and Affect: Mood normal.          Problem List Items Addressed This Visit       Routine postpartum follow-up ([facility]) - Primary    Overview      delivery of twins at 38 weeks on 2024 due to arrest of dilation. Delivery was complicated by preeclampsia with severe range pressures requiring magnesium sulfate and Nifedipine 30 mg at discharge.          Current Assessment & Plan     Patient is doing well postpartum. She will continue to monitor BP and will wean off medication. S/S of preeclampsia were reviewed.   She was advised to slowly increase activity. Pelvic rest is recommended until 6 weeks postpartum. She was advised to  continue prenatal vitamins and to assure adequate hydration.   Contraception was discussed.   She was advised to call the office with any concerning symptom, worsening of pain or bleeding, concern for postpartum depression or anxiety.   Follow up is planned in 1 week.

## 2024-04-16 ENCOUNTER — TELEPHONE (OUTPATIENT)
Dept: OBSTETRICS AND GYNECOLOGY | Facility: CLINIC | Age: 24
End: 2024-04-16
Payer: COMMERCIAL

## 2024-04-16 LAB
LABORATORY COMMENT REPORT: NORMAL
PATH REPORT.FINAL DX SPEC: NORMAL
PATH REPORT.GROSS SPEC: NORMAL
PATH REPORT.RELEVANT HX SPEC: NORMAL
PATH REPORT.TOTAL CANCER: NORMAL

## 2024-04-16 NOTE — TELEPHONE ENCOUNTER
Patient has appt with MAMADOU tomorrow , wonders if someone can advise her of how to properly use breast pump or should she be scheduled for lactation consult?

## 2024-04-17 ENCOUNTER — POSTPARTUM VISIT (OUTPATIENT)
Dept: OBSTETRICS AND GYNECOLOGY | Facility: CLINIC | Age: 24
End: 2024-04-17
Payer: COMMERCIAL

## 2024-04-17 VITALS — SYSTOLIC BLOOD PRESSURE: 138 MMHG | DIASTOLIC BLOOD PRESSURE: 90 MMHG | BODY MASS INDEX: 37.63 KG/M2 | WEIGHT: 180 LBS

## 2024-04-17 PROCEDURE — 99213 OFFICE O/P EST LOW 20 MIN: CPT | Performed by: OBSTETRICS & GYNECOLOGY

## 2024-04-25 ENCOUNTER — APPOINTMENT (OUTPATIENT)
Dept: OBSTETRICS AND GYNECOLOGY | Facility: CLINIC | Age: 24
End: 2024-04-25
Payer: COMMERCIAL

## 2024-04-29 ENCOUNTER — TELEPHONE (OUTPATIENT)
Dept: OBSTETRICS AND GYNECOLOGY | Facility: CLINIC | Age: 24
End: 2024-04-29
Payer: COMMERCIAL

## 2024-04-29 NOTE — TELEPHONE ENCOUNTER
Patient called wants to give an update on her blood pressure. Also stating her pediatrician wants to talk about PPV  patient had her to  do an assessment patient didn't do well. Please advise.

## 2024-04-29 NOTE — TELEPHONE ENCOUNTER
"Currently taking Nifedipine as prescribed, BP's are as follows:  4/18 139/98  4/19 138/76  4/20 120/69  4/21 139/96  4/22 124/89  4/23 124/82  4/24 134/97  Also advised by pediatrician to discuss PPD as she \"scored high\" on their screening.  Virtual appointment scheduled with Dr. Myers    "

## 2024-04-30 ENCOUNTER — TELEMEDICINE (OUTPATIENT)
Dept: OBSTETRICS AND GYNECOLOGY | Facility: CLINIC | Age: 24
End: 2024-04-30
Payer: COMMERCIAL

## 2024-04-30 VITALS — DIASTOLIC BLOOD PRESSURE: 90 MMHG | SYSTOLIC BLOOD PRESSURE: 143 MMHG

## 2024-04-30 PROCEDURE — 99214 OFFICE O/P EST MOD 30 MIN: CPT | Performed by: OBSTETRICS & GYNECOLOGY

## 2024-04-30 PROCEDURE — 1036F TOBACCO NON-USER: CPT | Performed by: OBSTETRICS & GYNECOLOGY

## 2024-04-30 RX ORDER — SERTRALINE HYDROCHLORIDE 50 MG/1
50 TABLET, FILM COATED ORAL DAILY
Qty: 30 TABLET | Refills: 11 | Status: SHIPPED | OUTPATIENT
Start: 2024-04-30 | End: 2024-05-30

## 2024-04-30 ASSESSMENT — EDINBURGH POSTNATAL DEPRESSION SCALE (EPDS)
I HAVE BEEN SO UNHAPPY THAT I HAVE BEEN CRYING: YES, QUITE OFTEN
THINGS HAVE BEEN GETTING ON TOP OF ME: YES, SOMETIMES I HAVEN'T BEEN COPING AS WELL AS USUAL
I HAVE BEEN ABLE TO LAUGH AND SEE THE FUNNY SIDE OF THINGS: NOT QUITE SO MUCH NOW
I HAVE BEEN SO UNHAPPY THAT I HAVE HAD DIFFICULTY SLEEPING: YES, SOMETIMES
THE THOUGHT OF HARMING MYSELF HAS OCCURRED TO ME: NEVER
I HAVE BLAMED MYSELF UNNECESSARILY WHEN THINGS WENT WRONG: YES, MOST OF THE TIME
I HAVE BEEN ANXIOUS OR WORRIED FOR NO GOOD REASON: YES, VERY OFTEN
I HAVE LOOKED FORWARD WITH ENJOYMENT TO THINGS: DEFINITELY LESS THAN I USED TO
I HAVE FELT SAD OR MISERABLE: YES, QUITE OFTEN
TOTAL SCORE: 19
I HAVE FELT SCARED OR PANICKY FOR NO GOOD REASON: YES, SOMETIMES

## 2024-04-30 NOTE — PROGRESS NOTES
Postpartum Progress Note    Assessment/Plan   Kindra Hunt is a 23 y.o.,  presents for postpartum visit. This visit was completed via telehealth. All issues as below were discussed and addressed but no physical exam was performed. If it was felt that the patient should be evaluated in the office then they were directed there. The patient verbally consented to the visit.    She had a  delivery of twins at 38 weeks on 2024 due to arrest of dilation. Delivery was complicated by preeclampsia with severe range pressures requiring magnesium sulfate and Nifedipine 30 mg at discharge. BP is still running in mild hypertension range.   She feels she has postpartum depression. There are no thoughts of harming herself or the babies. She is crying frequently, feels anxious and overwhelmed much of the time. She is also sleep deprived. Family is supportive and helpful. After discussion she desires to try medication for depression and will consider counseling.         Problem List       No episode was linked to this visit.          Hospital course: gestational hypertension  postpartum depression       Subjective         She reports  she is pumping breast milk and supplementing with formula.  She reports emotional concerns about depression and anxiety as above, reports no thoughts of harming self or baby(ies), and reports thoughts of harming self or baby(ies) today   Her plan for contraception is not yet discussed.           Allergies:   Adhesive tape-silicones         Physical Exam:  Physical Exam  Constitutional:       Appearance: Normal appearance.   HENT:      Head: Normocephalic and atraumatic.   Pulmonary:      Effort: Pulmonary effort is normal.   Neurological:      General: No focal deficit present.      Mental Status: She is alert and oriented to person, place, and time.   Skin:     Findings: No rash.   Psychiatric:         Mood and Affect: Mood normal.          Problem List Items Addressed This Visit        Postpartum depression    Overview     She desires medication for postpartum depression and anxiety. Family is supportive.          Current Assessment & Plan     Sertraline is prescribed and counseling information sent. Follow up in 4 weeks for recheck. She will call with any concerns.          Relevant Medications    sertraline (Zoloft) 50 mg tablet    Routine postpartum follow-up ([facility]) - Primary    Overview      delivery of twins at 38 weeks on 2024 due to arrest of dilation. Delivery was complicated by preeclampsia with severe range pressures requiring magnesium sulfate and Nifedipine 30 mg at discharge.

## 2024-04-30 NOTE — ASSESSMENT & PLAN NOTE
Sertraline is prescribed and counseling information sent. Follow up in 4 weeks for recheck. She will call with any concerns.

## 2024-05-07 ENCOUNTER — TREATMENT (OUTPATIENT)
Dept: PHYSICAL THERAPY | Facility: CLINIC | Age: 24
End: 2024-05-07
Payer: COMMERCIAL

## 2024-05-07 DIAGNOSIS — O26.893 PREGNANCY RELATED HIP PAIN IN THIRD TRIMESTER, ANTEPARTUM (HHS-HCC): Primary | ICD-10-CM

## 2024-05-07 DIAGNOSIS — M25.559 PREGNANCY RELATED HIP PAIN IN THIRD TRIMESTER, ANTEPARTUM (HHS-HCC): Primary | ICD-10-CM

## 2024-05-07 PROCEDURE — 97110 THERAPEUTIC EXERCISES: CPT | Mod: GP

## 2024-05-07 NOTE — PROGRESS NOTES
PHYSICAL THERAPY   - RECHECK     Patient Name:  Kindra Hunt   Patient MRN: [id number]  Date: 05/07/24    Time Calculation  Start Time: 1403  Stop Time: 1453  Time Calculation (min): 50 min    Insurance:  Insurance Type: Medical [facility]  Visit number: 3  Approved # of visits 25  Authorization Needed: no  Cert Date Ends On: n/a    General   Reason for visit: pregnancy pain  Referred by: Dr. Myers    Therapy diagnoses:   1. Pregnancy related hip pain in third trimester, antepartum ([facility])  Follow Up In Physical Therapy    Follow Up In Physical Therapy           Assessment:    Pt with overall good contraction of TA given early post partum status. She is demonstrating moderate posterior chain weakness. Her scar is healing well with 1 small red spot which will be followed up with by Dr. Myers, but given patient instructions to call MD if wound changes at all.    Plan:  Will see 1x per week x 4 weeks   Email or call if needed in the mean time     Exercises  - Clamshell  - 1 x daily - 7 x weekly - 2 sets - 10 reps - 5 seconds  hold  - Sidelying Reverse Clamshell  - 1 x daily - 7 x weekly - 2 sets - 10 reps - 5 seconds  hold  - Supine Diaphragmatic Breathing  - 1 x daily - 7 x weekly - 2 sets - 10 reps - 5 seconds  hold  - Quadruped Transversus Abdominis Bracing  - 1 x daily - 7 x weekly - 2 sets - 10 reps - 5 seconds  hold  - Cat Cow to Child's Pose  - 1 x daily - 7 x weekly - 2 sets - 10 reps - 5 seconds  hold  - Scar mobilizations 3-5 minutes daily above and below incision   Subjective  Patient was induced on 4/10, given a balloon and pitocin. Labor was stalled and c section. Preeclampsia + magnesium drip. She is taking iron and blood pressure medication. She is exclusively pumping. She saw a lactation specialist. She is not planning on returning to work.   Pain (0-10):  0    Precautions  PMH: none  Fall Risk:  no     Objective  AROM:  Trunk and spine WFL     PROM/Joint Mobility:  WFL R/L hip and knee      Strength:  Hip abduction 4/5 B  Glute extension 3/5 R/L  HS extension 3+/5 R/L  Difficulty dae TA in hooklying     Observation:   Tissue displacement to 2nd knuckle at umbilicus   2 finger diastasis, most active contraction at distal lower abs   Scar with 1 inferior 1 cm pocket of pink erythema, otherwise scabbed over and healing well. There is a mild scar shelf.     Treatment Performed:   10' Recheck   Therapeutic Exercise:  40 minutes  Recheck and HEP given, see above  Self Care:   minutes    Manual Therapy:   minutes    Neuromuscular Re-education:   minutes    Gait Training:    minutes    Modalities: minutes    Careplan Goals:  1. Pt will be independent in HEP to maximize PT POC   2. Pt will improve NIH CPSI by >50% raw score --> PROGRESSING   3. Pt will be able to improve worst pain severity on NPRS by >2 points MCID --> MET   4. Pt will be able to increase hip abduction and extension strength to 5/5 --> NOT MET, CONTINUE  5. Pt will be able to perform all bed mobility with less than 2/10 pain --> MET   6. Pt will be compliant with scar mobilization to help reduce the risk of pelvic floor dysfunction or LBP  7. Pt will be able to demonstrate full pelvic floor contraction and relaxation to optimize pelvic health post partum

## 2024-05-17 PROBLEM — M25.559 PREGNANCY RELATED HIP PAIN IN THIRD TRIMESTER, ANTEPARTUM (HHS-HCC): Status: RESOLVED | Noted: 2024-03-14 | Resolved: 2024-04-12

## 2024-05-17 PROBLEM — O26.893 PREGNANCY RELATED HIP PAIN IN THIRD TRIMESTER, ANTEPARTUM (HHS-HCC): Status: RESOLVED | Noted: 2024-03-14 | Resolved: 2024-04-12

## 2024-05-17 PROBLEM — Z3A.37 37 WEEKS GESTATION OF PREGNANCY (HHS-HCC): Status: RESOLVED | Noted: 2023-09-16 | Resolved: 2024-04-12

## 2024-05-17 PROBLEM — O30.043 DICHORIONIC DIAMNIOTIC TWIN PREGNANCY IN THIRD TRIMESTER (HHS-HCC): Status: RESOLVED | Noted: 2023-10-09 | Resolved: 2024-04-12

## 2024-05-17 PROBLEM — O35.9XX2: Status: RESOLVED | Noted: 2023-12-14 | Resolved: 2024-04-12

## 2024-05-22 NOTE — ASSESSMENT & PLAN NOTE
Patient is doing well postpartum. She may return to normal activity. She was advised to continue prenatal vitamins and to assure adequate hydration.   Contraception was discussed. Vasectomy is planned.   She was advised to call the office with any concerning symptom, worsening of pain or bleeding, concern for postpartum depression or anxiety.   Follow up is planned in 1 year or as needed.   She will stop Nifedipine and monitor BP and call if elevated.

## 2024-05-22 NOTE — PROGRESS NOTES
Postpartum Progress Note    Assessment/Plan   Kindra Hunt is a 23 y.o.,  presents for postpartum visit. She had a  delivery of twins at 38 weeks on 2024 due to arrest of dilation. Delivery was complicated by preeclampsia with severe range pressures requiring magnesium sulfate and Nifedipine 30 mg at discharge. She was doing well with blood pressures at last in office postpartum visit. Two weeks later she reached out with concerns for postpartum depression. Plan was to begin counseling and she was prescribed sertraline. This is working very well and she notes great improvement.         Problem List       No episode was linked to this visit.          Hospital course: gestational hypertension  postpartum depression       Subjective         She reports no breast or nursing problems  She reports mild depression today   Her plan for contraception is vasectomy           Allergies:   Adhesive tape-silicones         Physical Exam:  Physical Exam  Constitutional:       Appearance: Normal appearance.   Genitourinary:      Bladder, rectum and urethral meatus normal.      Right Labia: No rash or lesions.     Left Labia: No lesions or rash.     No vaginal discharge.      No vaginal prolapse present.     No vaginal atrophy present.       Right Adnexa: not tender and no mass present.     Left Adnexa: not tender and no mass present.     No cervical discharge or lesion.      Uterus is not enlarged or tender.      Pelvic exam was performed with patient in the lithotomy position.   HENT:      Head: Normocephalic and atraumatic.      Nose: Nose normal.   Cardiovascular:      Rate and Rhythm: Normal rate and regular rhythm.      Heart sounds: Normal heart sounds.   Pulmonary:      Effort: Pulmonary effort is normal.      Breath sounds: Normal breath sounds.   Abdominal:      Palpations: Abdomen is soft.      Comments: Incision is healing well.   Musculoskeletal:      Cervical back: Neck supple.   Neurological:       General: No focal deficit present.      Mental Status: She is alert and oriented to person, place, and time.   Skin:     General: Skin is warm and dry.      Findings: No rash.   Psychiatric:         Mood and Affect: Mood normal.          Problem List Items Addressed This Visit       Postpartum depression    Overview     Sertraline was prescribed 2024 and counseling planned for postpartum depression and anxiety.          Routine postpartum follow-up ([facility]) - Primary    Overview      delivery of twins at 38 weeks on 2024 due to arrest of dilation. Delivery was complicated by preeclampsia with severe range pressures requiring magnesium sulfate and Nifedipine 30 mg at discharge.          Current Assessment & Plan     Patient is doing well postpartum. She may return to normal activity. She was advised to continue prenatal vitamins and to assure adequate hydration.   Contraception was discussed.   She was advised to call the office with any concerning symptom, worsening of pain or bleeding, concern for postpartum depression or anxiety.   Follow up is planned

## 2024-05-23 ENCOUNTER — POSTPARTUM VISIT (OUTPATIENT)
Dept: OBSTETRICS AND GYNECOLOGY | Facility: CLINIC | Age: 24
End: 2024-05-23
Payer: COMMERCIAL

## 2024-05-23 VITALS
DIASTOLIC BLOOD PRESSURE: 78 MMHG | HEIGHT: 57 IN | SYSTOLIC BLOOD PRESSURE: 126 MMHG | BODY MASS INDEX: 33.44 KG/M2 | WEIGHT: 155 LBS

## 2024-05-23 PROCEDURE — 0503F POSTPARTUM CARE VISIT: CPT | Performed by: OBSTETRICS & GYNECOLOGY

## 2024-05-28 ENCOUNTER — APPOINTMENT (OUTPATIENT)
Dept: PHYSICAL THERAPY | Facility: CLINIC | Age: 24
End: 2024-05-28
Payer: COMMERCIAL

## 2024-06-04 ENCOUNTER — APPOINTMENT (OUTPATIENT)
Dept: PHYSICAL THERAPY | Facility: CLINIC | Age: 24
End: 2024-06-04
Payer: COMMERCIAL

## 2024-06-11 ENCOUNTER — TREATMENT (OUTPATIENT)
Dept: PHYSICAL THERAPY | Facility: CLINIC | Age: 24
End: 2024-06-11
Payer: COMMERCIAL

## 2024-06-11 DIAGNOSIS — M25.559 PREGNANCY RELATED HIP PAIN IN THIRD TRIMESTER, ANTEPARTUM (HHS-HCC): ICD-10-CM

## 2024-06-11 DIAGNOSIS — O26.893 PREGNANCY RELATED HIP PAIN IN THIRD TRIMESTER, ANTEPARTUM (HHS-HCC): ICD-10-CM

## 2024-06-11 PROCEDURE — 97110 THERAPEUTIC EXERCISES: CPT | Mod: GP

## 2024-06-11 NOTE — PROGRESS NOTES
PHYSICAL THERAPY   - RECHECK     Patient Name:  Kindra Hunt   Patient MRN: [id number]  Date: 06/11/24    Time Calculation  Start Time: 1539  Stop Time: 1609  Time Calculation (min): 30 min    Insurance:  Insurance Type: [facility]  Visit number: 4  Approved # of visits 25  Authorization Needed: no  Cert Date Ends On: n/a    General   Reason for visit: pregnancy pain  Referred by: Dr. Myers    Therapy diagnoses:   1. Pregnancy related hip pain in third trimester, antepartum (HHS-HCC)  Follow Up In Physical Therapy           Assessment:    Pt with overall good contraction of TA given early post partum status. She is demonstrating moderate posterior chain weakness. She is semi compliant with exercises but hasn't returned to the clinic in 4 weeks. She feels comfortable performing HEP and a kegel and no more in clinic appts noted.     Plan:  All patient's questions were answered and will discharge and follow up if needed.    Access Code: [email or web address]  URL: [email or web address]  Date: 06/11/2024  Prepared by: Erlinda Esteves    Exercises  - Sidelying Hip Abduction  - 1 x daily - 7 x weekly - 2 sets - 10 reps - 5 seconds  hold  - Child's Pose with Sidebending  - 1 x daily - 7 x weekly - 2 sets - 5 reps - 30 hold  - Hooklying Transversus Abdominis Palpation  - 1 x daily - 7 x weekly - 2 sets - 10 reps - 5 seconds  hold  - Supine Hip and Knee Flexion AROM with Swiss Ball  - 1 x daily - 7 x weekly - 2 sets - 10 reps - 5 seconds  hold  - Clamshell in Abduction  - 1 x daily - 7 x weekly - 2 sets - 10 reps - 5 seconds  hold  - Quick Flick Pelvic Floor Contractions in Hooklying  - 1 x daily - 7 x weekly - 2 sets - 10 reps - 5 seconds  hold  Subjective  8 weeks post partum, not breastfeeding. She started an anti anxiety medicine.   Pain (0-10):  0    Precautions  PMH: none  Fall Risk:  no     Objective  AROM:  Trunk and spine WFL     PROM/Joint Mobility:  WFL R/L hip and knee     Strength:  Hip  "abduction 4/5 B  Glute extension 3/5 R/L  HS extension 3+/5 R/L  Difficulty dae TA in hooklying     Observation:   Tissue displacement to 2nd knuckle at umbilicus   2 finger diastasis, most active contraction at distal lower abs   Scar with 1 inferior 1 cm pocket of pink erythema, otherwise scabbed over and healing well. There is a mild scar shelf.     Treatment Performed:   Therapeutic Exercise:  30 minutes  90-90 hooklying on SB TA x 2 minutes   Ball rolls in hooklying x 2 minutes   \" \" With kegel contraction  Bridge on SB x 15   LTR with ball x 2 minutes  Sidelying hip abduction R/L x 15  Sidelying hip abduction clamshell at 45 degrees R/L x 15   Quadruped bird dog 2 x 10   Quadruped child's pose biased R/L   Self Care:   minutes    Manual Therapy:   minutes    Neuromuscular Re-education:   minutes    Gait Training:    minutes    Modalities: minutes    Careplan Goals:  1. Pt will be independent in HEP to maximize PT POC   2. Pt will improve NIH CPSI by >50% raw score --> PROGRESSING   3. Pt will be able to improve worst pain severity on NPRS by >2 points MCID --> MET   4. Pt will be able to increase hip abduction and extension strength to 5/5 --> NOT MET, CONTINUE  5. Pt will be able to perform all bed mobility with less than 2/10 pain --> MET   6. Pt will be compliant with scar mobilization to help reduce the risk of pelvic floor dysfunction or LBP  7. Pt will be able to demonstrate full pelvic floor contraction and relaxation to optimize pelvic health post partum          "

## 2024-06-18 ENCOUNTER — APPOINTMENT (OUTPATIENT)
Dept: PHYSICAL THERAPY | Facility: CLINIC | Age: 24
End: 2024-06-18
Payer: COMMERCIAL

## 2025-06-18 ENCOUNTER — APPOINTMENT (OUTPATIENT)
Dept: OBSTETRICS AND GYNECOLOGY | Facility: CLINIC | Age: 25
End: 2025-06-18
Payer: COMMERCIAL

## (undated) DEVICE — SUTURE, MONOCRYL, 0, 18 IN, VIOLET

## (undated) DEVICE — GLOVE, SURGICAL, PROTEXIS PI MICRO, 6.0, PF, LF

## (undated) DEVICE — SUTURE, VICRYL, 0, 36 IN, CT, UNDYED

## (undated) DEVICE — SUTURE, VICRYL 0, 36 IN, CT-1, VIOLET

## (undated) DEVICE — DRAPE PACK, CESAREAN SECTION, CUSTOM, UHC

## (undated) DEVICE — TOWEL PACK, STERILE, 4/PACK, BLUE

## (undated) DEVICE — SUTURE, VICRYL, 4-0, 27 IN, KS, UNDYED